# Patient Record
Sex: MALE | Race: WHITE | Employment: FULL TIME | URBAN - METROPOLITAN AREA
[De-identification: names, ages, dates, MRNs, and addresses within clinical notes are randomized per-mention and may not be internally consistent; named-entity substitution may affect disease eponyms.]

---

## 2019-08-01 ENCOUNTER — HOSPITAL ENCOUNTER (EMERGENCY)
Age: 24
Discharge: HOME | End: 2019-08-02
Attending: SPECIALIST
Payer: COMMERCIAL

## 2019-08-01 ENCOUNTER — APPOINTMENT (OUTPATIENT)
Dept: GENERAL RADIOLOGY | Age: 24
End: 2019-08-01
Payer: COMMERCIAL

## 2019-08-01 VITALS
OXYGEN SATURATION: 100 % | SYSTOLIC BLOOD PRESSURE: 114 MMHG | HEIGHT: 68 IN | DIASTOLIC BLOOD PRESSURE: 68 MMHG | WEIGHT: 150 LBS | HEART RATE: 63 BPM | RESPIRATION RATE: 14 BRPM | BODY MASS INDEX: 22.73 KG/M2 | TEMPERATURE: 98.2 F

## 2019-08-01 DIAGNOSIS — S60.10XA SUBUNGUAL HEMATOMA OF DIGIT OF HAND, INITIAL ENCOUNTER: ICD-10-CM

## 2019-08-01 DIAGNOSIS — S62.639A CLOSED FRACTURE OF TUFT OF DISTAL PHALANX OF FINGER: Primary | ICD-10-CM

## 2019-08-01 PROCEDURE — 99283 EMERGENCY DEPT VISIT LOW MDM: CPT

## 2019-08-01 PROCEDURE — 6370000000 HC RX 637 (ALT 250 FOR IP): Performed by: SPECIALIST

## 2019-08-01 PROCEDURE — 11740 EVACUATION SUBUNGUAL HMTMA: CPT

## 2019-08-01 PROCEDURE — 73140 X-RAY EXAM OF FINGER(S): CPT

## 2019-08-01 RX ORDER — IBUPROFEN 600 MG/1
600 TABLET ORAL ONCE
Status: COMPLETED | OUTPATIENT
Start: 2019-08-01 | End: 2019-08-01

## 2019-08-01 RX ORDER — IBUPROFEN 600 MG/1
600 TABLET ORAL EVERY 6 HOURS PRN
Qty: 20 TABLET | Refills: 0 | Status: SHIPPED | OUTPATIENT
Start: 2019-08-01 | End: 2019-08-05

## 2019-08-01 RX ORDER — ACETAMINOPHEN 500 MG
1000 TABLET ORAL ONCE
Status: COMPLETED | OUTPATIENT
Start: 2019-08-01 | End: 2019-08-01

## 2019-08-01 RX ADMIN — ACETAMINOPHEN 1000 MG: 500 TABLET ORAL at 23:38

## 2019-08-01 RX ADMIN — IBUPROFEN 600 MG: 600 TABLET ORAL at 23:38

## 2019-08-01 ASSESSMENT — PAIN SCALES - GENERAL
PAINLEVEL_OUTOF10: 6
PAINLEVEL_OUTOF10: 6

## 2019-08-02 ASSESSMENT — ENCOUNTER SYMPTOMS
COLOR CHANGE: 1
BACK PAIN: 0

## 2019-08-02 ASSESSMENT — PAIN SCALES - GENERAL: PAINLEVEL_OUTOF10: 3

## 2019-08-02 NOTE — ED PROVIDER NOTES
reports that he does not use drugs. PHYSICAL EXAM     INITIAL VITALS:  height is 5' 8\" (1.727 m) and weight is 68 kg (150 lb). His oral temperature is 98.2 °F (36.8 °C). His blood pressure is 114/68 and his pulse is 63. His respiration is 14 and oxygen saturation is 100%. Physical Exam   Constitutional: He is oriented to person, place, and time. He appears well-developed and well-nourished. HENT:   Head: Normocephalic and atraumatic. Nose: Nose normal.   Mouth/Throat: Oropharynx is clear and moist.   Eyes: Pupils are equal, round, and reactive to light. EOM are normal.   Neck: Normal range of motion. Neck supple. Cardiovascular: Normal rate, regular rhythm, normal heart sounds and intact distal pulses. No murmur heard. Pulmonary/Chest: Effort normal and breath sounds normal. No respiratory distress. Abdominal: Soft. Bowel sounds are normal. He exhibits no distension. There is no tenderness. Musculoskeletal:        Left hand: He exhibits tenderness and swelling. He exhibits no deformity and no laceration. Patient has subungual hematoma in the proximal half of the left middle finger and there is mild edema and tenderness on the volar aspect of the left middle finger in the terminal phalangeal area. Skin is intact. Neurovascular examination is intact distally. Neurological: He is alert and oriented to person, place, and time. Skin: Skin is warm and dry. Nursing note and vitals reviewed.         DIFFERENTIAL DIAGNOSIS/ MDM:     Tuft fracture, dislocation, contusion, subungual hematoma    DIAGNOSTIC RESULTS     EKG: All EKG's are interpreted by the Emergency Department Physician who either signs or Co-signs this chart in the absence of a cardiologist.    None obtained    RADIOLOGY:   Non-plain film images such as CT, Ultrasound and MRI are read by the radiologist. Plain radiographic images are visualized and the radiologist interpretations are reviewed as follows:     Xr Finger Left (min 2 Views)    Result Date: 8/1/2019  EXAMINATION: THREE XRAY VIEWS OF THE LEFT FINGERS 8/1/2019 10:32 pm COMPARISON: None. HISTORY: ORDERING SYSTEM PROVIDED HISTORY: Injured with a hammer TECHNOLOGIST PROVIDED HISTORY: Injured with a hammer Reason for Exam: left hand injury Acuity: Acute Type of Exam: Initial FINDINGS: Frontal, lateral and oblique views of the left hand with emphasis on the 3rd digit. Soft tissue swelling about the 2nd and 3rd digits. Acute nondisplaced fracture of the distal tuft of the 3rd distal phalanx. Joint spaces are preserved. Bone mineralization is normal.     Acute fracture of the distal tuft of the 3rd distal phalanx. LABS:  No results found for this visit on 08/01/19. EMERGENCY DEPARTMENT COURSE:   Vitals:    Vitals:    08/01/19 2128 08/01/19 2335   BP: 114/68    Pulse: 63    Resp: 14    Temp: 98.2 °F (36.8 °C)    TempSrc: Oral    SpO2: 100%    Weight:  68 kg (150 lb)   Height:  5' 8\" (1.727 m)     -------------------------  BP: 114/68, Temp: 98.2 °F (36.8 °C), Pulse: 63, Resp: 14    Orders Placed This Encounter   Medications    ibuprofen (ADVIL;MOTRIN) tablet 600 mg    acetaminophen (TYLENOL) tablet 1,000 mg    ibuprofen (IBU) 600 MG tablet     Sig: Take 1 tablet by mouth every 6 hours as needed for Pain     Dispense:  20 tablet     Refill:  0         During emergency department course, patient was given Tylenol 1000 mg and Motrin 600 mg orally. Under strict aseptic precautions, subungual hematoma was drained by myself with the help of heat cautery. Patient tolerated the procedure well. Dressing and finger splint was applied. Plan is to discharge the patient with instructions to apply ice packs locally, elevate the left hand, take Tylenol and ibuprofen as needed for the pain, follow up with orthopedic surgeon Dr. Danielle Garrett, all his office tomorrow morning for appointment, return if worse.     CONSULTS:  Orthopedic surgeon as an outpatient    PROCEDURES:  None    FINAL

## 2019-08-02 NOTE — ED NOTES
Patient to ED after accidentally striking the tip of his left middle finger with a hammer. Site is swollen, blackened under nailbed. Injury happened about 20 mins ago. Has not attempted any interventions for pain.        Craig Dean RN  08/01/19 4454

## 2019-08-05 ENCOUNTER — OFFICE VISIT (OUTPATIENT)
Dept: ORTHOPEDIC SURGERY | Age: 24
End: 2019-08-05
Payer: COMMERCIAL

## 2019-08-05 VITALS
HEIGHT: 69 IN | BODY MASS INDEX: 19.99 KG/M2 | SYSTOLIC BLOOD PRESSURE: 138 MMHG | DIASTOLIC BLOOD PRESSURE: 78 MMHG | WEIGHT: 135 LBS

## 2019-08-05 DIAGNOSIS — S62.639A CLOSED FRACTURE OF TUFT OF DISTAL PHALANX OF FINGER: Primary | ICD-10-CM

## 2019-08-05 PROCEDURE — 99203 OFFICE O/P NEW LOW 30 MIN: CPT | Performed by: ORTHOPAEDIC SURGERY

## 2020-04-07 ENCOUNTER — HOSPITAL ENCOUNTER (EMERGENCY)
Age: 25
Discharge: HOME | End: 2020-04-08
Attending: EMERGENCY MEDICINE
Payer: COMMERCIAL

## 2020-04-07 PROCEDURE — 99284 EMERGENCY DEPT VISIT MOD MDM: CPT

## 2020-04-07 ASSESSMENT — PAIN DESCRIPTION - PAIN TYPE: TYPE: ACUTE PAIN

## 2020-04-07 ASSESSMENT — PAIN DESCRIPTION - LOCATION: LOCATION: GROIN

## 2020-04-07 ASSESSMENT — PAIN DESCRIPTION - ORIENTATION: ORIENTATION: RIGHT

## 2020-04-07 ASSESSMENT — PAIN SCALES - GENERAL: PAINLEVEL_OUTOF10: 3

## 2020-04-08 ENCOUNTER — APPOINTMENT (OUTPATIENT)
Dept: ULTRASOUND IMAGING | Age: 25
End: 2020-04-08
Payer: COMMERCIAL

## 2020-04-08 VITALS
DIASTOLIC BLOOD PRESSURE: 74 MMHG | TEMPERATURE: 99.5 F | SYSTOLIC BLOOD PRESSURE: 138 MMHG | OXYGEN SATURATION: 98 % | RESPIRATION RATE: 16 BRPM | HEIGHT: 68 IN | WEIGHT: 150 LBS | HEART RATE: 84 BPM | BODY MASS INDEX: 22.73 KG/M2

## 2020-04-08 LAB
-: NORMAL
AMORPHOUS: NORMAL
BACTERIA: NORMAL
BILIRUBIN URINE: NEGATIVE
CASTS UA: NORMAL /LPF
COLOR: YELLOW
COMMENT UA: NORMAL
CRYSTALS, UA: NORMAL /HPF
EPITHELIAL CELLS UA: NORMAL /HPF (ref 0–5)
GLUCOSE URINE: NEGATIVE
KETONES, URINE: NEGATIVE
LEUKOCYTE ESTERASE, URINE: NEGATIVE
MUCUS: NORMAL
NITRITE, URINE: NEGATIVE
OTHER OBSERVATIONS UA: NORMAL
PH UA: 6.5 (ref 5–8)
PROTEIN UA: NEGATIVE
RBC UA: NORMAL /HPF (ref 0–2)
RENAL EPITHELIAL, UA: NORMAL /HPF
SPECIFIC GRAVITY UA: 1.01 (ref 1–1.03)
TRICHOMONAS: NORMAL
TURBIDITY: CLEAR
URINE HGB: NEGATIVE
UROBILINOGEN, URINE: NORMAL
WBC UA: NORMAL /HPF (ref 0–5)
YEAST: NORMAL

## 2020-04-08 PROCEDURE — 93976 VASCULAR STUDY: CPT

## 2020-04-08 PROCEDURE — 87491 CHLMYD TRACH DNA AMP PROBE: CPT

## 2020-04-08 PROCEDURE — 81015 MICROSCOPIC EXAM OF URINE: CPT

## 2020-04-08 PROCEDURE — 87591 N.GONORRHOEAE DNA AMP PROB: CPT

## 2020-04-08 PROCEDURE — 76870 US EXAM SCROTUM: CPT

## 2020-04-08 NOTE — ED PROVIDER NOTES
bilaterally. No hernia palpable. Normal cremasteric reflex. MUSCULOSKELETAL: No midline spinal tenderness, step off or deformity. Extremities are otherwise nontender to palpation and nonerythematous. Compartments soft. No peripheral edema. NEUROLOGIC: alert and oriented x 3, GCS 15, normal mentation and speech. Moves all extremities x 4 without motor or sensory deficit, gait is stable without ataxia  PSYCHIATRIC: normal mood and affect, thought process is clear and linear    DIAGNOSTIC RESULTS     EKG:  None    RADIOLOGY:   Us Scrotum And Testicles    Result Date: 4/8/2020  EXAMINATION: ULTRASOUND OF THE SCROTUM/TESTICLES WITH COLOR DOPPLER FLOW EVALUATION; DOPPLER EVALUATION 4/7/2020 COMPARISON: None. HISTORY: ORDERING SYSTEM PROVIDED HISTORY: right testicular pain TECHNOLOGIST PROVIDED HISTORY: right testicular pain Reason for Exam: right testicular pain; r/o torsion Acuity: Acute Type of Exam: Initial FINDINGS: Measurements: Right testicle: 4.6 x 2.7 x 2.6 cm Left testicle: 4.2 x 3.2 x 2.6 cm Right: Grey scale: The right testicle demonstrates normal homogeneous echotexture without focal lesion. No evidence of testicular microlithiasis. Doppler Evaluation:  There is normal arterial and venous Doppler flow within the testicle. Scrotal Sac:  Small hydrocele. Deep to the right testicle there is a heterogeneously hypoechoic tubular structure which measures up to 4 mm in diameter and demonstrates no internal blood flow. This could represent edema within the scrotal wall or a thrombosed vessel. Epididymis:  No acute abnormality. Left: Grey scale: The left testicle demonstrates normal homogeneous echotexture without focal lesion. No evidence of testicular microlithiasis. Doppler Evaluation:  There is normal arterial and venous Doppler flow within the testicle. Scrotal Sac:  Small hydrocele. No evidence of varicocele. Epididymis:  No acute abnormality.      1.  Normal testicles without evidence of testicular torsion. 2.  Small bilateral hydroceles. 3.  Deep to the right testicle there is a heterogeneously hypoechoic tubular structure which measures up to 4 mm in diameter and demonstrates no internal blood flow. Findings are nonspecific but could represent edema within the scrotal wall or a thrombosed vessel. Us Dup Abd Pel Retro Scrot Limited    Result Date: 4/8/2020  EXAMINATION: ULTRASOUND OF THE SCROTUM/TESTICLES WITH COLOR DOPPLER FLOW EVALUATION; DOPPLER EVALUATION 4/7/2020 COMPARISON: None. HISTORY: ORDERING SYSTEM PROVIDED HISTORY: right testicular pain TECHNOLOGIST PROVIDED HISTORY: right testicular pain Reason for Exam: right testicular pain; r/o torsion Acuity: Acute Type of Exam: Initial FINDINGS: Measurements: Right testicle: 4.6 x 2.7 x 2.6 cm Left testicle: 4.2 x 3.2 x 2.6 cm Right: Grey scale: The right testicle demonstrates normal homogeneous echotexture without focal lesion. No evidence of testicular microlithiasis. Doppler Evaluation:  There is normal arterial and venous Doppler flow within the testicle. Scrotal Sac:  Small hydrocele. Deep to the right testicle there is a heterogeneously hypoechoic tubular structure which measures up to 4 mm in diameter and demonstrates no internal blood flow. This could represent edema within the scrotal wall or a thrombosed vessel. Epididymis:  No acute abnormality. Left: Grey scale: The left testicle demonstrates normal homogeneous echotexture without focal lesion. No evidence of testicular microlithiasis. Doppler Evaluation:  There is normal arterial and venous Doppler flow within the testicle. Scrotal Sac:  Small hydrocele. No evidence of varicocele. Epididymis:  No acute abnormality. 1.  Normal testicles without evidence of testicular torsion. 2.  Small bilateral hydroceles. 3.  Deep to the right testicle there is a heterogeneously hypoechoic tubular structure which measures up to 4 mm in diameter and demonstrates no internal blood flow.   Findings are nonspecific but could represent edema within the scrotal wall or a thrombosed vessel. LABS:  Results for orders placed or performed during the hospital encounter of 04/07/20   Urinalysis Reflex to Culture   Result Value Ref Range    Color, UA YELLOW YELLOW    Turbidity UA CLEAR CLEAR    Glucose, Ur NEGATIVE NEGATIVE    Bilirubin Urine NEGATIVE NEGATIVE    Ketones, Urine NEGATIVE NEGATIVE    Specific Gravity, UA 1.010 1.005 - 1.030    Urine Hgb NEGATIVE NEGATIVE    pH, UA 6.5 5.0 - 8.0    Protein, UA NEGATIVE NEGATIVE    Urobilinogen, Urine Normal Normal    Nitrite, Urine NEGATIVE NEGATIVE    Leukocyte Esterase, Urine NEGATIVE NEGATIVE    Urinalysis Comments       Microscopic exam not performed based on chemical results unless requested in original order. Urinalysis Comments          Urinalysis Comments       Utilizing a urinalysis as the only screening method to exclude a potential uropathogen can be unreliable in many patient populations. Rapid screening tests are less sensitive than culture and if UTI is a clinical possibility, culture should be considered despite a negative urinalysis. URINALYSIS, MICRO   Result Value Ref Range    -          WBC, UA None 0 - 5 /HPF    RBC, UA 0 TO 2 0 - 2 /HPF    Casts UA NOT REPORTED /LPF    Crystals, UA NOT REPORTED None /HPF    Epithelial Cells UA 0 TO 2 0 - 5 /HPF    Renal Epithelial, UA NOT REPORTED 0 /HPF    Bacteria, UA NOT REPORTED None    Mucus, UA NOT REPORTED None    Trichomonas, UA None None    Amorphous, UA NOT REPORTED None    Other Observations UA NOT REPORTED NOT REQ. Yeast, UA NOT REPORTED None       EMERGENCY DEPARTMENT COURSE:        The patient was given the following medications:  No orders of the defined types were placed in this encounter.        Vitals:    Vitals:    04/07/20 2338 04/08/20 0147   BP: (!) 142/89 138/74   Pulse: 97 84   Resp: 18 16   Temp: 99.5 °F (37.5 °C)    TempSrc: Oral    SpO2: 99% 98%   Weight: 68 kg (150 lb) Height: 5' 8\" (1.727 m)      -------------------------  BP: 138/74, Temp: 99.5 °F (37.5 °C), Pulse: 84, Resp: 16    CONSULTS:  None    CRITICAL CARE:   None    PROCEDURES:  None    DIAGNOSIS/ MDM:   Katie Warner is a 26 y.o. male who presents with right testicular pain. Vital signs are stable. Heart regular rate and rhythm. Abdomen soft and nontender with no signs of hernia. He states that the right testicle is painful but not tender to palpation. The right testicle lies superior and anterior compared to the left testicle. Normal cremasteric reflex. Urinalysis is unremarkable. Ultrasound of the scrotum and testicles reveals normal testicles without any evidence of torsion. There are small bilateral hydroceles. Due to the right testicle there is a heterogeneous hypoechoic tubular structure which measures up to 4 mm in diameter and demonstrates no internal blood flow. Radiology states that these findings are nonspecific but could represent edema within the scrotal wall or a thrombosed vessel. I have low suspicion for torsion, infection, or testicular tumor at this time, however we discussed that the patient will need to follow-up with urology for further evaluation. The patient states that his pain is very mild and he declines any pain medications. I gave him information to follow-up with urology and instructed him to take ibuprofen or Tylenol as needed for pain. He was instructed to return to the ED for worsening symptoms or any other concern. At this time the patient is without objective evidence of an acute process requiring hospitalization or inpatient management. They have remained hemodynamically stable and are stable for discharge with outpatient follow-up. The patient is agreeable with plan and is requesting discharge. They were given the opportunity to ask questions and all questions were answered. The patient was given both written and verbal discharge instructions.  The patient expressed understanding. FINAL IMPRESSION      1. Pain in right testicle    2. Hydrocele, unspecified hydrocele type          DISPOSITION/PLAN   DISPOSITION          PATIENT REFERRED TO:  Francheska Soto MD  [address] [facility]  [address] [facility] [phone]    Schedule an appointment as soon as possible for a visit in 2 days  Urologist    Rito Thompson MD  [address] [phone]      Urologist    [facility]  [address] [facility].   [place] [address]  [phone]  Go to   If symptoms worsen      DISCHARGE MEDICATIONS:  New Prescriptions    No medications on file       (Please note that portions of this note were completed with a voice recognitionprogram.  Efforts were made to edit the dictations but occasionally words are mis-transcribed.)    [address]  Emergency Physician Attending          Jackie Michele DO  04/08/20 4342

## 2020-04-09 LAB
C. TRACHOMATIS DNA ,URINE: NEGATIVE
N. GONORRHOEAE DNA, URINE: NEGATIVE
SPECIMEN DESCRIPTION: NORMAL

## 2022-08-10 ENCOUNTER — HOSPITAL ENCOUNTER (OUTPATIENT)
Age: 27
Setting detail: OBSERVATION
Discharge: HOME | End: 2022-08-11
Attending: EMERGENCY MEDICINE | Admitting: STUDENT IN AN ORGANIZED HEALTH CARE EDUCATION/TRAINING PROGRAM
Payer: COMMERCIAL

## 2022-08-10 ENCOUNTER — APPOINTMENT (OUTPATIENT)
Dept: CT IMAGING | Age: 27
End: 2022-08-10
Payer: COMMERCIAL

## 2022-08-10 DIAGNOSIS — I26.99 BILATERAL PULMONARY EMBOLISM (HCC): Primary | ICD-10-CM

## 2022-08-10 DIAGNOSIS — R07.1 CHEST PAIN ON BREATHING: ICD-10-CM

## 2022-08-10 PROBLEM — Z72.0 TOBACCO ABUSE: Status: ACTIVE | Noted: 2022-08-10

## 2022-08-10 LAB
ABSOLUTE EOS #: 0 K/UL (ref 0–0.4)
ABSOLUTE LYMPH #: 0.7 K/UL (ref 1–4.8)
ABSOLUTE MONO #: 0.6 K/UL (ref 0.1–1.2)
ALBUMIN SERPL-MCNC: 4.4 G/DL (ref 3.5–5.2)
ALBUMIN/GLOBULIN RATIO: 1.5 (ref 1–2.5)
ALP BLD-CCNC: 64 U/L (ref 40–129)
ALT SERPL-CCNC: 13 U/L (ref 5–41)
ANION GAP SERPL CALCULATED.3IONS-SCNC: 9 MMOL/L (ref 9–17)
AST SERPL-CCNC: 15 U/L
BASOPHILS # BLD: 0 % (ref 0–2)
BASOPHILS ABSOLUTE: 0 K/UL (ref 0–0.2)
BILIRUB SERPL-MCNC: 0.36 MG/DL (ref 0.3–1.2)
BUN BLDV-MCNC: 8 MG/DL (ref 6–20)
CALCIUM SERPL-MCNC: 9.5 MG/DL (ref 8.6–10.4)
CHLORIDE BLD-SCNC: 103 MMOL/L (ref 98–107)
CO2: 26 MMOL/L (ref 20–31)
CREAT SERPL-MCNC: 0.83 MG/DL (ref 0.7–1.2)
D-DIMER QUANTITATIVE: 3.28 MG/L FEU
EOSINOPHILS RELATIVE PERCENT: 0 % (ref 1–4)
GFR AFRICAN AMERICAN: >60 ML/MIN
GFR NON-AFRICAN AMERICAN: >60 ML/MIN
GFR SERPL CREATININE-BSD FRML MDRD: ABNORMAL ML/MIN/{1.73_M2}
GLUCOSE BLD-MCNC: 141 MG/DL (ref 70–99)
HCT VFR BLD CALC: 36.7 % (ref 41–53)
HCT VFR BLD CALC: 38.9 % (ref 41–53)
HEMOGLOBIN: 12.8 G/DL (ref 13.5–17.5)
HEMOGLOBIN: 13.6 G/DL (ref 13.5–17.5)
HOMOCYSTEINE: 8 UMOL/L
LIPASE: 15 U/L (ref 13–60)
LYMPHOCYTES # BLD: 8 % (ref 24–44)
MCH RBC QN AUTO: 32.1 PG (ref 26–34)
MCH RBC QN AUTO: 32.3 PG (ref 26–34)
MCHC RBC AUTO-ENTMCNC: 34.8 G/DL (ref 31–37)
MCHC RBC AUTO-ENTMCNC: 34.9 G/DL (ref 31–37)
MCV RBC AUTO: 92.4 FL (ref 80–100)
MCV RBC AUTO: 92.5 FL (ref 80–100)
MONOCYTES # BLD: 7 % (ref 2–11)
PARTIAL THROMBOPLASTIN TIME: 61.6 SEC (ref 21.3–31.3)
PDW BLD-RTO: 12.9 % (ref 12.5–15.4)
PDW BLD-RTO: 12.9 % (ref 12.5–15.4)
PLATELET # BLD: 202 K/UL (ref 140–450)
PLATELET # BLD: 222 K/UL (ref 140–450)
PMV BLD AUTO: 7.6 FL (ref 6–12)
PMV BLD AUTO: 7.7 FL (ref 6–12)
POTASSIUM SERPL-SCNC: 4.1 MMOL/L (ref 3.7–5.3)
RBC # BLD: 3.97 M/UL (ref 4.5–5.9)
RBC # BLD: 4.21 M/UL (ref 4.5–5.9)
SARS-COV-2, RAPID: NOT DETECTED
SEG NEUTROPHILS: 85 % (ref 36–66)
SEGMENTED NEUTROPHILS ABSOLUTE COUNT: 7.5 K/UL (ref 1.8–7.7)
SODIUM BLD-SCNC: 138 MMOL/L (ref 135–144)
SPECIMEN DESCRIPTION: NORMAL
TOTAL PROTEIN: 7.4 G/DL (ref 6.4–8.3)
TROPONIN, HIGH SENSITIVITY: <6 NG/L (ref 0–22)
WBC # BLD: 6.8 K/UL (ref 3.5–11)
WBC # BLD: 8.8 K/UL (ref 3.5–11)

## 2022-08-10 PROCEDURE — 86147 CARDIOLIPIN ANTIBODY EA IG: CPT

## 2022-08-10 PROCEDURE — 83690 ASSAY OF LIPASE: CPT

## 2022-08-10 PROCEDURE — 81240 F2 GENE: CPT

## 2022-08-10 PROCEDURE — 2580000003 HC RX 258: Performed by: EMERGENCY MEDICINE

## 2022-08-10 PROCEDURE — 96375 TX/PRO/DX INJ NEW DRUG ADDON: CPT

## 2022-08-10 PROCEDURE — 85379 FIBRIN DEGRADATION QUANT: CPT

## 2022-08-10 PROCEDURE — 96365 THER/PROPH/DIAG IV INF INIT: CPT

## 2022-08-10 PROCEDURE — 6360000002 HC RX W HCPCS: Performed by: NURSE PRACTITIONER

## 2022-08-10 PROCEDURE — 94760 N-INVAS EAR/PLS OXIMETRY 1: CPT

## 2022-08-10 PROCEDURE — 36415 COLL VENOUS BLD VENIPUNCTURE: CPT

## 2022-08-10 PROCEDURE — 81291 MTHFR GENE: CPT

## 2022-08-10 PROCEDURE — 80053 COMPREHEN METABOLIC PANEL: CPT

## 2022-08-10 PROCEDURE — G0378 HOSPITAL OBSERVATION PER HR: HCPCS

## 2022-08-10 PROCEDURE — 81241 F5 GENE: CPT

## 2022-08-10 PROCEDURE — 83036 HEMOGLOBIN GLYCOSYLATED A1C: CPT

## 2022-08-10 PROCEDURE — 85027 COMPLETE CBC AUTOMATED: CPT

## 2022-08-10 PROCEDURE — 85300 ANTITHROMBIN III ACTIVITY: CPT

## 2022-08-10 PROCEDURE — 71260 CT THORAX DX C+: CPT | Performed by: PHYSICIAN ASSISTANT

## 2022-08-10 PROCEDURE — 85303 CLOT INHIBIT PROT C ACTIVITY: CPT

## 2022-08-10 PROCEDURE — 85610 PROTHROMBIN TIME: CPT

## 2022-08-10 PROCEDURE — 85613 RUSSELL VIPER VENOM DILUTED: CPT

## 2022-08-10 PROCEDURE — 6360000004 HC RX CONTRAST MEDICATION: Performed by: EMERGENCY MEDICINE

## 2022-08-10 PROCEDURE — 6360000002 HC RX W HCPCS: Performed by: PHYSICIAN ASSISTANT

## 2022-08-10 PROCEDURE — 86038 ANTINUCLEAR ANTIBODIES: CPT

## 2022-08-10 PROCEDURE — 96366 THER/PROPH/DIAG IV INF ADDON: CPT

## 2022-08-10 PROCEDURE — 85306 CLOT INHIBIT PROT S FREE: CPT

## 2022-08-10 PROCEDURE — 2580000003 HC RX 258: Performed by: PHYSICIAN ASSISTANT

## 2022-08-10 PROCEDURE — 86225 DNA ANTIBODY NATIVE: CPT

## 2022-08-10 PROCEDURE — 99285 EMERGENCY DEPT VISIT HI MDM: CPT

## 2022-08-10 PROCEDURE — 85025 COMPLETE CBC W/AUTO DIFF WBC: CPT

## 2022-08-10 PROCEDURE — 83090 ASSAY OF HOMOCYSTEINE: CPT

## 2022-08-10 PROCEDURE — 87635 SARS-COV-2 COVID-19 AMP PRB: CPT

## 2022-08-10 PROCEDURE — 84484 ASSAY OF TROPONIN QUANT: CPT

## 2022-08-10 PROCEDURE — 99219 PR INITIAL OBSERVATION CARE/DAY 50 MINUTES: CPT | Performed by: STUDENT IN AN ORGANIZED HEALTH CARE EDUCATION/TRAINING PROGRAM

## 2022-08-10 PROCEDURE — 85730 THROMBOPLASTIN TIME PARTIAL: CPT

## 2022-08-10 PROCEDURE — 6370000000 HC RX 637 (ALT 250 FOR IP): Performed by: STUDENT IN AN ORGANIZED HEALTH CARE EDUCATION/TRAINING PROGRAM

## 2022-08-10 PROCEDURE — 93005 ELECTROCARDIOGRAM TRACING: CPT | Performed by: PHYSICIAN ASSISTANT

## 2022-08-10 RX ORDER — ONDANSETRON 4 MG/1
4 TABLET, ORALLY DISINTEGRATING ORAL EVERY 8 HOURS PRN
Status: DISCONTINUED | OUTPATIENT
Start: 2022-08-10 | End: 2022-08-11 | Stop reason: HOSPADM

## 2022-08-10 RX ORDER — 0.9 % SODIUM CHLORIDE 0.9 %
500 INTRAVENOUS SOLUTION INTRAVENOUS ONCE
Status: COMPLETED | OUTPATIENT
Start: 2022-08-10 | End: 2022-08-10

## 2022-08-10 RX ORDER — HEPARIN SODIUM 10000 [USP'U]/100ML
5-30 INJECTION, SOLUTION INTRAVENOUS CONTINUOUS
Status: DISCONTINUED | OUTPATIENT
Start: 2022-08-10 | End: 2022-08-10 | Stop reason: DRUGHIGH

## 2022-08-10 RX ORDER — SODIUM CHLORIDE 0.9 % (FLUSH) 0.9 %
5-40 SYRINGE (ML) INJECTION EVERY 12 HOURS SCHEDULED
Status: DISCONTINUED | OUTPATIENT
Start: 2022-08-10 | End: 2022-08-11 | Stop reason: HOSPADM

## 2022-08-10 RX ORDER — HEPARIN SODIUM 1000 [USP'U]/ML
80 INJECTION, SOLUTION INTRAVENOUS; SUBCUTANEOUS PRN
Status: DISCONTINUED | OUTPATIENT
Start: 2022-08-10 | End: 2022-08-11

## 2022-08-10 RX ORDER — ACETAMINOPHEN 650 MG/1
650 SUPPOSITORY RECTAL EVERY 6 HOURS PRN
Status: DISCONTINUED | OUTPATIENT
Start: 2022-08-10 | End: 2022-08-11 | Stop reason: HOSPADM

## 2022-08-10 RX ORDER — SODIUM CHLORIDE 0.9 % (FLUSH) 0.9 %
5-40 SYRINGE (ML) INJECTION PRN
Status: DISCONTINUED | OUTPATIENT
Start: 2022-08-10 | End: 2022-08-11 | Stop reason: HOSPADM

## 2022-08-10 RX ORDER — ONDANSETRON 2 MG/ML
4 INJECTION INTRAMUSCULAR; INTRAVENOUS EVERY 6 HOURS PRN
Status: DISCONTINUED | OUTPATIENT
Start: 2022-08-10 | End: 2022-08-11 | Stop reason: HOSPADM

## 2022-08-10 RX ORDER — SODIUM CHLORIDE 0.9 % (FLUSH) 0.9 %
10 SYRINGE (ML) INJECTION PRN
Status: DISCONTINUED | OUTPATIENT
Start: 2022-08-10 | End: 2022-08-11 | Stop reason: HOSPADM

## 2022-08-10 RX ORDER — 0.9 % SODIUM CHLORIDE 0.9 %
80 INTRAVENOUS SOLUTION INTRAVENOUS ONCE
Status: DISCONTINUED | OUTPATIENT
Start: 2022-08-10 | End: 2022-08-10

## 2022-08-10 RX ORDER — SODIUM CHLORIDE 9 MG/ML
INJECTION, SOLUTION INTRAVENOUS PRN
Status: DISCONTINUED | OUTPATIENT
Start: 2022-08-10 | End: 2022-08-11 | Stop reason: HOSPADM

## 2022-08-10 RX ORDER — HEPARIN SODIUM 1000 [USP'U]/ML
80 INJECTION, SOLUTION INTRAVENOUS; SUBCUTANEOUS PRN
Status: DISCONTINUED | OUTPATIENT
Start: 2022-08-10 | End: 2022-08-10

## 2022-08-10 RX ORDER — HEPARIN SODIUM 1000 [USP'U]/ML
80 INJECTION, SOLUTION INTRAVENOUS; SUBCUTANEOUS ONCE
Status: COMPLETED | OUTPATIENT
Start: 2022-08-10 | End: 2022-08-10

## 2022-08-10 RX ORDER — HEPARIN SODIUM 10000 [USP'U]/100ML
5-30 INJECTION, SOLUTION INTRAVENOUS CONTINUOUS
Status: DISCONTINUED | OUTPATIENT
Start: 2022-08-10 | End: 2022-08-11

## 2022-08-10 RX ORDER — HEPARIN SODIUM 1000 [USP'U]/ML
40 INJECTION, SOLUTION INTRAVENOUS; SUBCUTANEOUS PRN
Status: DISCONTINUED | OUTPATIENT
Start: 2022-08-10 | End: 2022-08-11

## 2022-08-10 RX ORDER — HEPARIN SODIUM 1000 [USP'U]/ML
40 INJECTION, SOLUTION INTRAVENOUS; SUBCUTANEOUS PRN
Status: DISCONTINUED | OUTPATIENT
Start: 2022-08-10 | End: 2022-08-10

## 2022-08-10 RX ORDER — HEPARIN SODIUM 1000 [USP'U]/ML
80 INJECTION, SOLUTION INTRAVENOUS; SUBCUTANEOUS ONCE
Status: DISCONTINUED | OUTPATIENT
Start: 2022-08-10 | End: 2022-08-10 | Stop reason: DRUGHIGH

## 2022-08-10 RX ORDER — KETOROLAC TROMETHAMINE 15 MG/ML
15 INJECTION, SOLUTION INTRAMUSCULAR; INTRAVENOUS ONCE
Status: COMPLETED | OUTPATIENT
Start: 2022-08-10 | End: 2022-08-10

## 2022-08-10 RX ORDER — MORPHINE SULFATE 2 MG/ML
2 INJECTION, SOLUTION INTRAMUSCULAR; INTRAVENOUS
Status: DISCONTINUED | OUTPATIENT
Start: 2022-08-10 | End: 2022-08-11

## 2022-08-10 RX ORDER — HEPARIN SODIUM 10000 [USP'U]/100ML
5-30 INJECTION, SOLUTION INTRAVENOUS CONTINUOUS
Status: DISCONTINUED | OUTPATIENT
Start: 2022-08-10 | End: 2022-08-10

## 2022-08-10 RX ORDER — ACETAMINOPHEN 325 MG/1
650 TABLET ORAL EVERY 6 HOURS PRN
Status: DISCONTINUED | OUTPATIENT
Start: 2022-08-10 | End: 2022-08-11 | Stop reason: HOSPADM

## 2022-08-10 RX ORDER — MORPHINE SULFATE 4 MG/ML
4 INJECTION, SOLUTION INTRAMUSCULAR; INTRAVENOUS
Status: DISCONTINUED | OUTPATIENT
Start: 2022-08-10 | End: 2022-08-11

## 2022-08-10 RX ORDER — POLYETHYLENE GLYCOL 3350 17 G/17G
17 POWDER, FOR SOLUTION ORAL DAILY PRN
Status: DISCONTINUED | OUTPATIENT
Start: 2022-08-10 | End: 2022-08-11 | Stop reason: HOSPADM

## 2022-08-10 RX ADMIN — SODIUM CHLORIDE 500 ML: 9 INJECTION, SOLUTION INTRAVENOUS at 12:50

## 2022-08-10 RX ADMIN — Medication 80 ML: at 13:08

## 2022-08-10 RX ADMIN — SODIUM CHLORIDE, PRESERVATIVE FREE 10 ML: 5 INJECTION INTRAVENOUS at 13:09

## 2022-08-10 RX ADMIN — MORPHINE SULFATE 2 MG: 2 INJECTION, SOLUTION INTRAMUSCULAR; INTRAVENOUS at 21:46

## 2022-08-10 RX ADMIN — HEPARIN SODIUM 4970 UNITS: 1000 INJECTION INTRAVENOUS; SUBCUTANEOUS at 14:29

## 2022-08-10 RX ADMIN — ACETAMINOPHEN 650 MG: 325 TABLET ORAL at 20:05

## 2022-08-10 RX ADMIN — IOPAMIDOL 75 ML: 755 INJECTION, SOLUTION INTRAVENOUS at 13:09

## 2022-08-10 RX ADMIN — KETOROLAC TROMETHAMINE 15 MG: 15 INJECTION, SOLUTION INTRAMUSCULAR; INTRAVENOUS at 12:49

## 2022-08-10 RX ADMIN — HEPARIN SODIUM AND DEXTROSE 18 UNITS/KG/HR: 10000; 5 INJECTION INTRAVENOUS at 14:33

## 2022-08-10 ASSESSMENT — PAIN DESCRIPTION - ORIENTATION
ORIENTATION: LEFT

## 2022-08-10 ASSESSMENT — PAIN DESCRIPTION - DESCRIPTORS
DESCRIPTORS: STABBING
DESCRIPTORS: ACHING
DESCRIPTORS: STABBING

## 2022-08-10 ASSESSMENT — LIFESTYLE VARIABLES
HOW OFTEN DO YOU HAVE A DRINK CONTAINING ALCOHOL: 2-4 TIMES A MONTH
HOW MANY STANDARD DRINKS CONTAINING ALCOHOL DO YOU HAVE ON A TYPICAL DAY: 1 OR 2

## 2022-08-10 ASSESSMENT — PAIN SCALES - GENERAL
PAINLEVEL_OUTOF10: 8
PAINLEVEL_OUTOF10: 6
PAINLEVEL_OUTOF10: 4
PAINLEVEL_OUTOF10: 3

## 2022-08-10 ASSESSMENT — PAIN DESCRIPTION - LOCATION
LOCATION: CHEST
LOCATION: RIB CAGE

## 2022-08-10 ASSESSMENT — PAIN - FUNCTIONAL ASSESSMENT: PAIN_FUNCTIONAL_ASSESSMENT: 0-10

## 2022-08-10 NOTE — ED NOTES
Report given.      .Electronically signed by Joanne Matt RN on 8/10/2022 at 4:39 PM      Joanne Matt RN  08/10/22 [id number]

## 2022-08-10 NOTE — ED NOTES
Attempted to call report to inpatient, was notified that the RN taking patient will return call because she is currently taking an admission     . Electronically signed by Vijaya Colindres RN on 8/10/2022 at 3:54 PM      Vijaya Colindres [facility]  08/10/22 [id number]

## 2022-08-10 NOTE — ED PROVIDER NOTES
I was present in the ED during this patient's evaluation and management by the Advance Practice Provider and was available to address any concerns about their medical management. Summation      Patient Course: Admitted              Final Impression:   1. Bilateral pulmonary embolism (HCC)    2. Chest pain on breathing               (Please note that portions of this note were completed with a voice recognition program.  Efforts were made to edit the dictations but occasionally words are mis-transcribed.)     CLAUDIA Zavala MD  Attending, Emergency Department       Aracelis Zamudio MD  08/10/22 [address] MD Joey  08/16/22 1728

## 2022-08-10 NOTE — ED PROVIDER NOTES
[facility]. ICU  [address] [address]  Phone: 130 [facility] Name: Paulina Scruggs  MRN: [id number]  [place] 1995  Date of evaluation: 8/10/22      CHIEF COMPLAINT:  Chief Complaint   Patient presents with    Chest Pain     Left sided, x 4days, constant, increasing pain to left shoulder and back when deep breathing       HISTORY OF PRESENT ILLNESS    Paulina Scruggs is a 33 y.o. male who presents with RIB/PLEURITIC PAIN complaint:       Context/Setting:   Patient here for evaluation of persistent left-sided pleuritic pain over the last 4 days that is afebrile and nontraumatic in nature. Patient states this is very painful even with normal breathing but he also describes with movement this is provoking. He has had no recent upper respiratory/viral type symptoms. He denies any other pertinent cardiac/respiratory/abdominal/back past medical history. Patient reports that this pain is deep/pleuritic in nature and does radiate up to his left shoulder. No rash reported or any recent precipitating overexertion or trauma. Patient does report that he smokes daily. No thrombotic history. He denies any swelling or pain of the lower extremities    Location/Symptom:  Pleuritic pain? YES   Trauma? NO    Cough? YES    Productive? NO  Fever? NO  SOB? YES, when painful  Wheezing? NO  Chestpain associated? Yes  Smoker? YES     Timing/Onset:   4 days  Quality:   sharp  Duration:   constant  Modifying Factors:   breathing, movement  Severity:   moderate    Nursing Notes were reviewed. REVIEW OF SYSTEMS       Constitutional:  Per HPI  Eyes: No visual changes. Neck: No neck pain. Respiratory:  Per HPI  Cardiac:  Per HPI   GI:  Denies abdominal pain. Denies nausea. Denies vomiting. Denies diarrhea. : Denies dysuria. Musculoskeletal: Denies back pain. Denies focal weakness.     Neurologic: Denies headache or focal weakness. Skin:  Denies any rash. Negative in 10 essential Systems except as mentioned above and in the HPI. PAST MEDICAL HISTORY   PMH:  has no past medical history on file. Surgical History:  has no past surgical history on file. Social History:  reports that he has been smoking cigarettes. He uses smokeless tobacco. He reports that he does not currently use alcohol. He reports that he does not use drugs. Family History: Noncontributory at this time  Psychiatric History: Noncontributory at this time    Allergies:has No Known Allergies. PHYSICAL EXAM     INITIAL VITALS: /73   Pulse 61   Temp 98.4 °F (36.9 °C) (Oral)   Resp 16   Ht 5' 8\" (1.727 m)   Wt 62 kg (136 lb 11 oz)   SpO2 98%   BMI 20.78 kg/m²   Constitutional:  Well developed, nontoxic. Mild pain distress. Eyes:  Pupils equal/round  HENT:  Atraumatic, external ears normal, nose normal, post pharynx and bilat tonsils wnl. No exudates. oropharynx moist. Neck- supple, no lyphadenopathy. No midline pain. Respiratory:  Clear to auscultation bilaterally with guarded air exchange due to pain elicited. No anterior chestwall TTP or signs of injury/edema/rash. No W/R/R. Cardiovascular:  RRR with normal S1 and S2  Gastrointestinal/Abdomen:  Soft, NT.  BS present. Musculoskeletal:  No edema, no tenderness, no deformities of extremities. Back:  No CVA tenderness. No midline TTP. Normal to inspection. Integument:  No rash. Neurologic:  Alert & age appropriate mentation, no focal deficits noted       DIAGNOSTIC RESULTS     EKG: All EKG's are interpreted by the Emergency Department Physician who either signs or Co-signs this chart in the absence of a cardiologist.  Not indicated, or per attending note    RADIOLOGY:   Reviewed the radiologist:  US DUP LOWER EXTREMITY RIGHT FLAVIA   Final Result   1. No evidence of DVT in the right lower extremity. 2.  No evidence of DVT in the left lower extremity.          [address] [facility] DUP LOWER EXTREMITY LEFT FLAVIA   Final Result   1. No evidence of DVT in the right lower extremity. 2.  No evidence of DVT in the left lower extremity. CT CHEST PULMONARY EMBOLISM W CONTRAST   Final Result   Addendum (preliminary) 1 of 1   ADDENDUM:   Critical results were called by Dr. Brissa Webber to SELENA Iraheta on   8/10/2022 at 1:36 PM EST. Final   Bilateral pulmonary emboli with segmental emboli in the left lower lobe and   subsegmental emboli in the lingula and both lower lobes. Findings suspicious for small pulmonary infarcts in the left greater than   right lower lobes. Small layering left pleural effusion with mild left lower lobe atelectasis.                  LABS:  Labs Reviewed   CBC WITH AUTO DIFFERENTIAL - Abnormal; Notable for the following components:       Result Value    RBC 4.21 (*)     Hematocrit 38.9 (*)     Seg Neutrophils 85 (*)     Lymphocytes 8 (*)     Eosinophils % 0 (*)     Absolute Lymph # 0.70 (*)     All other components within normal limits   COMPREHENSIVE METABOLIC PANEL - Abnormal; Notable for the following components:    Glucose 141 (*)     All other components within normal limits   APTT - Abnormal; Notable for the following components:    PTT 61.6 (*)     All other components within normal limits   APTT - Abnormal; Notable for the following components:    PTT 50.8 (*)     All other components within normal limits   CBC - Abnormal; Notable for the following components:    RBC 3.97 (*)     Hemoglobin 12.8 (*)     Hematocrit 36.7 (*)     All other components within normal limits   CBC WITH AUTO DIFFERENTIAL - Abnormal; Notable for the following components:    RBC 4.12 (*)     Hemoglobin 13.1 (*)     Hematocrit 38.3 (*)     All other components within normal limits   APTT - Abnormal; Notable for the following components:    PTT 33.5 (*)     All other components within normal limits   COVID-19, RAPID   LIPASE   TROPONIN   D-DIMER, QUANTITATIVE   ANNETTE SCREEN WITH REFLEX   HOMOCYSTEINE   LUPUS ANTICOAGULANT   HEMOGLOBIN B7B   BASIC METABOLIC PANEL W/ REFLEX TO MG FOR LOW K   ANTITHROMBIN 3 ACTIVITY   FACTOR 5 LEIDEN   PROTEIN S ACTIVITY   PROTHROMBIN GENE MUTATION   MTHFR MUTATION   PROTEIN C FUNCTIONAL       EMERGENCY DEPARTMENT COURSE:     1212  Significant left-sided pleuritic pain without any tenderness to palpation of the chest wall. Patient is PERC negative but given the level of discomfort that he is exhibiting I am adding a D-dimer to his cardiac work-up. Lungs are clear to auscultation, all vital signs are stable. No significant abd TTP. He denies any PMH. Risk Stratification for Pulmonary Embolism (PE)  Previous PE - No  Malignancy - No  Obesity - No  Trauma - No  Waldo filter - No  Pregnancy/postpartum - No  Smoking - Yes  Prior DVT - No  Immobilization (i.e. leg cast, travel) - No  Surgery within the last 60 days - No  Coagulation disorder - No  Estrogen medicine - No      PERC Criteria     Age      > or = 50    No  Heart Rate     > or = 100   No  Pulse Oximetry    >  95%    Yes  Previous History of DVT   No  Trauma or Surgery within 4 Weeks-   No  Hemoptysis-    No  Exogenous Estrogen use-  No  Unilateral Leg Swelling-   No    1347  Bilat PE, discussed with pt and Dr Goodman/[place] for admit. Additional labs and Heparin ordered. Pt agreeable to admit.        Orders Placed This Encounter   Medications    ketorolac (TORADOL) injection 15 mg    0.9 % sodium chloride bolus    DISCONTD: sodium chloride flush 0.9 % injection 10 mL    DISCONTD: 0.9 % sodium chloride bolus    iopamidol (ISOVUE-370) 76 % injection 75 mL    DISCONTD: heparin (porcine) injection 5,620 Units    DISCONTD: heparin 25,000 units in dextrose 5% 250 mL (premix) infusion    DISCONTD: sodium chloride flush 0.9 % injection 5-40 mL    DISCONTD: sodium chloride flush 0.9 % injection 5-40 mL    DISCONTD: 0.9 % sodium chloride infusion    DISCONTD: ondansetron (ZOFRAN-ODT) disintegrating tablet 4 mg    DISCONTD: ondansetron (ZOFRAN) injection 4 mg    DISCONTD: polyethylene glycol (GLYCOLAX) packet 17 g    DISCONTD: acetaminophen (TYLENOL) tablet 650 mg    DISCONTD: acetaminophen (TYLENOL) suppository 650 mg    DISCONTD: heparin (porcine) injection 5,620 Units    DISCONTD: heparin (porcine) injection 2,810 Units    DISCONTD: heparin 25,000 units in dextrose 5% 250 mL (premix) infusion    DISCONTD: heparin 25,000 units in dextrose 5% 250 mL (premix) infusion    heparin (porcine) injection 4,970 Units    DISCONTD: heparin (porcine) injection 4,970 Units    DISCONTD: heparin (porcine) injection 2,480 Units    DISCONTD: morphine (PF) injection 2 mg    DISCONTD: morphine injection 4 mg    DISCONTD: apixaban (ELIQUIS) tablet 10 mg     Order Specific Question:   Indication of Use     Answer:   Treatment-DVT/PE    DISCONTD: apixaban (ELIQUIS) tablet 5 mg     Order Specific Question:   Indication of Use     Answer:   Treatment-DVT/PE    apixaban (ELIQUIS) 5 MG TABS tablet     Sig: Take 2 tablets by mouth in the morning and 2 tablets before bedtime. Do all this for 14 doses. Dispense:  28 tablet     Refill:  0    apixaban (ELIQUIS) 5 MG TABS tablet     Sig: Take 1 tablet by mouth in the morning and 1 tablet before bedtime. Dispense:  60 tablet     Refill:  5    oxyCODONE-acetaminophen (PERCOCET) 5-325 MG per tablet     Sig: Take 1 tablet by mouth every 6 hours as needed for Pain for up to 3 days. Intended supply: 3 days. Take lowest dose possible to manage pain     Dispense:  12 tablet     Refill:  0     Reduce doses taken as pain becomes manageable    DISCONTD: oxyCODONE-acetaminophen (PERCOCET) 5-325 MG per tablet 1 tablet       CONSULTS:  None      FINAL IMPRESSION      1.  Bilateral pulmonary embolism (HCC)    2. Chest pain on breathing          DISPOSITION/PLAN:  DISPOSITION Admitted 08/10/2022 02:11:06 PM        PATIENT REFERRED TO:  Edy Santoyo MD  [address] [place] [address]   [facility] [place] [address]  [phone]    Schedule an appointment as soon as possible for a visit  Hypercoagulability workup    DISCHARGE MEDICATIONS:  Discharge Medication List as of 8/11/2022 10:20 AM        START taking these medications    Details   !! apixaban (ELIQUIS) 5 MG TABS tablet Take 2 tablets by mouth in the morning and 2 tablets before bedtime. Do all this for 14 doses. , Disp-28 tablet, R-0Normal      !! apixaban (ELIQUIS) 5 MG TABS tablet Take 1 tablet by mouth in the morning and 1 tablet before bedtime. , Disp-60 tablet, R-5Normal      oxyCODONE-acetaminophen (PERCOCET) 5-325 MG per tablet Take 1 tablet by mouth every 6 hours as needed for Pain for up to 3 days. Intended supply: 3 days. Take lowest dose possible to manage pain, Disp-12 tablet, R-0Normal       !! - Potential duplicate medications found. Please discuss with provider.           (Please note that portions of this note were completed with a voice recognition program.  Efforts were made to edit the dictations but occasionally words are mis-transcribed.)    MARISOL Soto PA-C  08/15/22 7895

## 2022-08-10 NOTE — H&P
[facility]  Office: [address], DO, Shira Nyhan, DO, Radha Fall, DO, Meron Kali Blood, DO, Angel Lucas MD, Tahmina May MD, Sheldon Booth MD, Claudio Rodriguez MD,  Andreina Thomas MD, Luana Waters MD, Maisha Coronel, DO, Buck Leija MD,  Ghada Boone MD, Verena Hannon MD, Adriel Maldonado, DO, Simba Brantley MD, Pedro Ace MD, Gerry Spears MD, Praveen Zapata, DO, Jeremiah Singer MD, Lyndon Ferraro MD, Graham Varela, CNP,  Zaira Joaquin, CNP, Michael Faria, CNP, Елена Key, CNP, Kulwant Edwards PA-C, Satish Alvarado, DNP, Shar Tubbs, CNP, Chase Johnson, CNP, Timothy Blackmon, CNP, Marimar Aragon, CNP, Turner Johansen, CNP, Sarah Watts, CNS, Daniel Cortez, DNP, Margarito Ramirez, CNP, Marie Landers, CNP, Yael Mack, CNP           [address] [facility]    HISTORY AND PHYSICAL EXAMINATION            Date:   8/10/2022  Patient name:  Mary Yap  Date of admission:  8/10/2022 11:18 AM  MRN:   [id number]  Account:  [de-identified]  YOB: 1995  PCP:    No primary care provider on file. Room:   ER06/ER06  Code Status:    No Order    Chief Complaint:     Chief Complaint   Patient presents with    Chest Pain     Left sided, x 4days, constant, increasing pain to left shoulder and back when deep breathing     History Obtained From:     patient    History of Present Illness:     Mary Yap is a 33 y.o. male with no past medical history (on no medications) who presented to the emergency department on 8/10/2022 complaining of left-sided chest wall pain. The patient states that the pain began several days ago and has progressively worsened. The pain is pleuritic and is aggravated by taking a deep breath. In the ED, the patient was afebrile and nontoxic appearing. D-dimer was found to be elevated at 3.28.  CTPA was done and was remarkable for bilateral pulmonary emboli as well as consolidative opacities suggestive of pulmonary infarcts. The patient denies having a history of previous blood clots and states that no one in his family has had a blood clot before. He is admitted to internal medicine for further management of bilateral pulmonary emboli. Past Medical History:     History reviewed. No pertinent past medical history. Past Surgical History:     History reviewed. No pertinent surgical history. Medications Prior to Admission:     Prior to Admission medications    Not on File        Allergies:     Patient has no known allergies. Social History:     Tobacco:    reports that he has been smoking cigarettes. He uses smokeless tobacco.  Alcohol:      reports that he does not currently use alcohol. Drug Use:  reports no history of drug use. Family History:     History reviewed. No pertinent family history. Review of Systems:     Positive and Negative as described in HPI. CONSTITUTIONAL:  negative for fevers, chills, sweats, fatigue, weight loss  HEENT:  negative for vision, hearing changes, runny nose, throat pain  RESPIRATORY:  negative for shortness of breath, cough, congestion, wheezing  CARDIOVASCULAR:  Positive for chest pain.    GASTROINTESTINAL:  negative for nausea, vomiting, diarrhea, constipation, change in bowel habits, abdominal pain   GENITOURINARY:  negative for difficulty of urination, burning with urination, frequency   INTEGUMENT:  negative for rash, skin lesions, easy bruising   HEMATOLOGIC/LYMPHATIC:  negative for swelling/edema   ALLERGIC/IMMUNOLOGIC:  negative for urticaria , itching  ENDOCRINE:  negative increase in drinking, increase in urination, hot or cold intolerance  MUSCULOSKELETAL:  negative joint pains, muscle aches, swelling of joints  NEUROLOGICAL:  negative for headaches, dizziness, lightheadedness, numbness, pain, tingling extremities  BEHAVIOR/PSYCH:  negative for depression, anxiety    Physical Exam:   /78   Pulse 58   Temp 98.4 °F (36.9 °C) (Oral)   Resp 15   Ht 5' 8\" (1.727 m)   Wt 136 lb 12.8 oz (62.1 kg)   SpO2 99%   BMI 20.80 kg/m²   Temp (24hrs), Av.4 °F (36.9 °C), Min:98.4 °F (36.9 °C), Max:98.4 °F (36.9 °C)    No results for input(s): POCGLU in the last 72 hours.   No intake or output data in the 24 hours ending 08/10/22 1358    General Appearance:  alert, well appearing, and in no acute distress  Mental status: oriented to person, place, and time  Head:  normocephalic, atraumatic  Eye: no icterus, redness, pupils equal and reactive, extraocular eye movements intact, conjunctiva clear  Ear: normal external ear, no discharge, hearing intact  Nose:  no drainage noted  Mouth: mucous membranes moist  Neck: supple, no carotid bruits, thyroid not palpable  Lungs: Bilateral equal air entry, clear to ausculation, no wheezing, rales or rhonchi, normal effort  Cardiovascular: normal rate, regular rhythm, no murmur, gallop, rub  Abdomen: Soft, nontender, nondistended, normal bowel sounds, no hepatomegaly or splenomegaly  Neurologic: There are no new focal motor or sensory deficits, normal muscle tone and bulk, no abnormal sensation, normal speech, cranial nerves II through XII grossly intact  Skin: No gross lesions, rashes, bruising or bleeding on exposed skin area  Extremities:  peripheral pulses palpable, no pedal edema or calf pain with palpation  Psych: normal affect     Investigations:      Laboratory Testing:  Recent Results (from the past 24 hour(s))   CBC with Auto Differential    Collection Time: 08/10/22 11:30 AM   Result Value Ref Range    WBC 8.8 3.5 - 11.0 k/uL    RBC 4.21 (L) 4.5 - 5.9 m/uL    Hemoglobin 13.6 13.5 - 17.5 g/dL    Hematocrit 38.9 (L) 41 - 53 %    MCV 92.5 80 - 100 fL    MCH 32.3 26 - 34 pg    MCHC 34.9 31 - 37 g/dL    RDW 12.9 12.5 - 15.4 %    Platelets 585 471 - 717 k/uL    MPV 7.7 6.0 - 12.0 fL    Seg Neutrophils 85 (H) 36 - 66 %    Lymphocytes 8 (L) 24 - 44 %    Monocytes 7 2 - 11 %    Eosinophils % Tobacco abuse 8/10/2022 Yes       Plan:     Patient status inpatient in the Progressive Unit/Step down    Bilateral pulmonary emboli   -CTPA showing bilateral pulmonary emboli with segmental emboli in the lingula as well as bilateral lower lobes   -Continuous heparin infusion x 24 hours; plan to transition to Eliquis on discharge   -Bilateral lower extremity dopplers pending   -Echocardiogram pending   -Hypercoagulability workup pending   -COVID-19 rapid test pending   -Daily CBC   -Daily BMP     Tobacco abuse   -Patient states that he smokes about a pack of cigarettes daily   -Will  on cessation     Consultations:   None      Clayton Berumen MD  8/10/2022  1:58 PM    Copy sent to Dr. Caleb Oswald primary care provider on file.

## 2022-08-11 ENCOUNTER — APPOINTMENT (OUTPATIENT)
Dept: ULTRASOUND IMAGING | Age: 27
End: 2022-08-11
Payer: COMMERCIAL

## 2022-08-11 VITALS
TEMPERATURE: 98.4 F | RESPIRATION RATE: 16 BRPM | BODY MASS INDEX: 20.72 KG/M2 | WEIGHT: 136.69 LBS | DIASTOLIC BLOOD PRESSURE: 73 MMHG | HEIGHT: 68 IN | HEART RATE: 61 BPM | OXYGEN SATURATION: 98 % | SYSTOLIC BLOOD PRESSURE: 118 MMHG

## 2022-08-11 LAB
ABSOLUTE EOS #: 0.1 K/UL (ref 0–0.4)
ABSOLUTE LYMPH #: 1.6 K/UL (ref 1–4.8)
ABSOLUTE MONO #: 0.7 K/UL (ref 0.1–1.2)
ANION GAP SERPL CALCULATED.3IONS-SCNC: 9 MMOL/L (ref 9–17)
BASOPHILS # BLD: 1 % (ref 0–2)
BASOPHILS ABSOLUTE: 0 K/UL (ref 0–0.2)
BUN BLDV-MCNC: 11 MG/DL (ref 6–20)
CALCIUM SERPL-MCNC: 9.3 MG/DL (ref 8.6–10.4)
CHLORIDE BLD-SCNC: 103 MMOL/L (ref 98–107)
CO2: 26 MMOL/L (ref 20–31)
CREAT SERPL-MCNC: 0.72 MG/DL (ref 0.7–1.2)
EKG ATRIAL RATE: 60 BPM
EKG P AXIS: 70 DEGREES
EKG P-R INTERVAL: 134 MS
EKG Q-T INTERVAL: 368 MS
EKG QRS DURATION: 90 MS
EKG QTC CALCULATION (BAZETT): 368 MS
EKG R AXIS: 59 DEGREES
EKG T AXIS: 47 DEGREES
EKG VENTRICULAR RATE: 60 BPM
EOSINOPHILS RELATIVE PERCENT: 2 % (ref 1–4)
ESTIMATED AVERAGE GLUCOSE: 103 MG/DL
GFR AFRICAN AMERICAN: >60 ML/MIN
GFR NON-AFRICAN AMERICAN: >60 ML/MIN
GFR SERPL CREATININE-BSD FRML MDRD: NORMAL ML/MIN/{1.73_M2}
GLUCOSE BLD-MCNC: 95 MG/DL (ref 70–99)
HBA1C MFR BLD: 5.2 % (ref 4–6)
HCT VFR BLD CALC: 38.3 % (ref 41–53)
HEMOGLOBIN: 13.1 G/DL (ref 13.5–17.5)
LV EF: 55 %
LVEF MODALITY: NORMAL
LYMPHOCYTES # BLD: 25 % (ref 24–44)
MCH RBC QN AUTO: 31.8 PG (ref 26–34)
MCHC RBC AUTO-ENTMCNC: 34.2 G/DL (ref 31–37)
MCV RBC AUTO: 92.8 FL (ref 80–100)
MONOCYTES # BLD: 10 % (ref 2–11)
PARTIAL THROMBOPLASTIN TIME: 33.5 SEC (ref 21.3–31.3)
PARTIAL THROMBOPLASTIN TIME: 50.8 SEC (ref 21.3–31.3)
PDW BLD-RTO: 12.9 % (ref 12.5–15.4)
PLATELET # BLD: 211 K/UL (ref 140–450)
PMV BLD AUTO: 7.6 FL (ref 6–12)
POTASSIUM SERPL-SCNC: 3.9 MMOL/L (ref 3.7–5.3)
RBC # BLD: 4.12 M/UL (ref 4.5–5.9)
SEG NEUTROPHILS: 62 % (ref 36–66)
SEGMENTED NEUTROPHILS ABSOLUTE COUNT: 4.2 K/UL (ref 1.8–7.7)
SODIUM BLD-SCNC: 138 MMOL/L (ref 135–144)
WBC # BLD: 6.6 K/UL (ref 3.5–11)

## 2022-08-11 PROCEDURE — 36415 COLL VENOUS BLD VENIPUNCTURE: CPT

## 2022-08-11 PROCEDURE — 6360000002 HC RX W HCPCS: Performed by: NURSE PRACTITIONER

## 2022-08-11 PROCEDURE — 85025 COMPLETE CBC W/AUTO DIFF WBC: CPT

## 2022-08-11 PROCEDURE — 93306 TTE W/DOPPLER COMPLETE: CPT

## 2022-08-11 PROCEDURE — 85730 THROMBOPLASTIN TIME PARTIAL: CPT

## 2022-08-11 PROCEDURE — G0378 HOSPITAL OBSERVATION PER HR: HCPCS

## 2022-08-11 PROCEDURE — 2580000003 HC RX 258: Performed by: STUDENT IN AN ORGANIZED HEALTH CARE EDUCATION/TRAINING PROGRAM

## 2022-08-11 PROCEDURE — 80048 BASIC METABOLIC PNL TOTAL CA: CPT

## 2022-08-11 PROCEDURE — 96366 THER/PROPH/DIAG IV INF ADDON: CPT

## 2022-08-11 PROCEDURE — 93971 EXTREMITY STUDY: CPT

## 2022-08-11 PROCEDURE — 96376 TX/PRO/DX INJ SAME DRUG ADON: CPT

## 2022-08-11 PROCEDURE — 6360000002 HC RX W HCPCS: Performed by: STUDENT IN AN ORGANIZED HEALTH CARE EDUCATION/TRAINING PROGRAM

## 2022-08-11 PROCEDURE — 6370000000 HC RX 637 (ALT 250 FOR IP): Performed by: STUDENT IN AN ORGANIZED HEALTH CARE EDUCATION/TRAINING PROGRAM

## 2022-08-11 PROCEDURE — 99217 PR OBSERVATION CARE DISCHARGE MANAGEMENT: CPT | Performed by: STUDENT IN AN ORGANIZED HEALTH CARE EDUCATION/TRAINING PROGRAM

## 2022-08-11 RX ORDER — OXYCODONE HYDROCHLORIDE AND ACETAMINOPHEN 5; 325 MG/1; MG/1
1 TABLET ORAL EVERY 4 HOURS PRN
Status: DISCONTINUED | OUTPATIENT
Start: 2022-08-11 | End: 2022-08-11 | Stop reason: HOSPADM

## 2022-08-11 RX ORDER — OXYCODONE HYDROCHLORIDE AND ACETAMINOPHEN 5; 325 MG/1; MG/1
1 TABLET ORAL EVERY 6 HOURS PRN
Qty: 12 TABLET | Refills: 0 | Status: SHIPPED | OUTPATIENT
Start: 2022-08-11 | End: 2022-08-14

## 2022-08-11 RX ADMIN — SODIUM CHLORIDE, PRESERVATIVE FREE 10 ML: 5 INJECTION INTRAVENOUS at 07:45

## 2022-08-11 RX ADMIN — HEPARIN SODIUM 2480 UNITS: 1000 INJECTION INTRAVENOUS; SUBCUTANEOUS at 02:16

## 2022-08-11 RX ADMIN — MORPHINE SULFATE 4 MG: 4 INJECTION INTRAVENOUS at 02:21

## 2022-08-11 RX ADMIN — APIXABAN 10 MG: 5 TABLET, FILM COATED ORAL at 07:44

## 2022-08-11 RX ADMIN — HEPARIN SODIUM AND DEXTROSE 20 UNITS/KG/HR: 10000; 5 INJECTION INTRAVENOUS at 02:16

## 2022-08-11 RX ADMIN — OXYCODONE HYDROCHLORIDE AND ACETAMINOPHEN 1 TABLET: 5; 325 TABLET ORAL at 11:03

## 2022-08-11 ASSESSMENT — PAIN SCALES - GENERAL
PAINLEVEL_OUTOF10: 8
PAINLEVEL_OUTOF10: 7

## 2022-08-11 NOTE — CARE COORDINATION
08/11/22 0858   Service Assessment   Patient Orientation Alert and Oriented   Cognition Alert   History Provided By Patient   Primary Caregiver Self   Support Systems Spouse/Significant Other   Patient's Healthcare Decision Maker is: Legal Next of Kin   PCP Verified by CM No   Prior Functional Level Independent in ADLs/IADLs   Current Functional Level Independent in ADLs/IADLs   Can patient return to prior living arrangement Yes   Social/Functional History   Lives With Significant other;Daughter   Type of Home Apartment   ADL Assistance Independent   Homemaking Assistance Independent   Ambulation Assistance Independent   Transfer Assistance Independent   Active  Yes   Discharge Planning   Type of Residence Apartment   Living Arrangements Spouse/Significant Other;Children   Current Services Prior To Admission None   Potential Assistance Needed N/A   Type of Home Care Services None   Patient expects to be discharged to: [address] [place] [address] Discharge   Mode of Transport at Discharge Self   Confirm Follow Up Transport Family   Condition of Participation: Discharge Planning   The Patient and/or Patient Representative was provided with a Choice of Provider? Patient   The Patient and/Or Patient Representative agree with the Discharge Plan?  Yes       D/c home independent

## 2022-08-11 NOTE — DISCHARGE SUMMARY
found to be elevated at 3.28. CTPA was done and was remarkable for bilateral pulmonary emboli as well as consolidative opacities suggestive of pulmonary infarcts. The patient denies having a history of previous blood clots and states that no one in his family has had a blood clot before. He was admitted to internal medicine for further management of bilateral pulmonary emboli. The patient improved with a continuous heparin infusion during admission. He was transitioned to Eliquis prior to discharge. The patient is discharged today (8/11) in stable condition. He is instructed to start taking Eliquis as prescribed and follow-up with hematology for an outpatient hypercoagulability workup. Significant therapeutic interventions: Heparin infusion     Significant Diagnostic Studies:   Labs / Micro:  BMP:    Lab Results   Component Value Date/Time    GLUCOSE 95 08/11/2022 01:40 AM     08/11/2022 01:40 AM    K 3.9 08/11/2022 01:40 AM     08/11/2022 01:40 AM    CO2 26 08/11/2022 01:40 AM    ANIONGAP 9 08/11/2022 01:40 AM    BUN 11 08/11/2022 01:40 AM    CREATININE 0.72 08/11/2022 01:40 AM    CALCIUM 9.3 08/11/2022 01:40 AM    LABGLOM >60 08/11/2022 01:40 AM    GFRAA >60 08/11/2022 01:40 AM    GFR      08/11/2022 01:40 AM       Radiology:  CT CHEST PULMONARY EMBOLISM W CONTRAST    Addendum Date: 8/10/2022    ADDENDUM: Critical results were called by Dr. Bae Loss to SELENA Marina on 8/10/2022 at 1:36 PM EST. Result Date: 8/10/2022  Bilateral pulmonary emboli with segmental emboli in the left lower lobe and subsegmental emboli in the lingula and both lower lobes. Findings suspicious for small pulmonary infarcts in the left greater than right lower lobes. Small layering left pleural effusion with mild left lower lobe atelectasis.        Consultations:    Consults:     Final Specialist Recommendations/Findings:   None      The patient was seen and examined on day of discharge and this discharge summary is in conjunction with any daily progress note from day of discharge. Discharge plan:     Disposition: Home    Physician Follow Up:     [place]  [address] [place] [address]  [phone]    Schedule an appointment as soon as possible for a visit  Hypercoagulability workup       Requiring Further Evaluation/Follow Up POST HOSPITALIZATION/Incidental Findings: Patient will need to follow-up with hematology for an outpatient hypercoagulability workup. Diet: regular diet    Activity: As tolerated    Instructions to Patient: Take Eliquis as prescribed for a minimum of 6-months. Follow-up with hematology for an outpatient hypercoagulability workup. Discharge Medications:      Medication List        START taking these medications      * apixaban 5 MG Tabs tablet  Commonly known as: ELIQUIS  Take 2 tablets by mouth in the morning and 2 tablets before bedtime. Do all this for 14 doses. * apixaban 5 MG Tabs tablet  Commonly known as: ELIQUIS  Take 1 tablet by mouth in the morning and 1 tablet before bedtime. Start taking on: August 18, 2022     oxyCODONE-acetaminophen 5-325 MG per tablet  Commonly known as: Percocet  Take 1 tablet by mouth every 6 hours as needed for Pain for up to 3 days. Intended supply: 3 days. Take lowest dose possible to manage pain           * This list has 2 medication(s) that are the same as other medications prescribed for you. Read the directions carefully, and ask your doctor or other care provider to review them with you. Where to Get Your Medications        These medications were sent to [facility] [id number] Patrice Mccann, [address]  [address], [address]      Phone: [phone]   apixaban 5 MG Tabs tablet  apixaban 5 MG Tabs tablet  oxyCODONE-acetaminophen 5-325 MG per tablet         No discharge procedures on file.     Time Spent on discharge is  31 mins in patient examination, evaluation, counseling as well as medication reconciliation, prescriptions for required medications, discharge plan and follow up. Electronically signed by   Angelo Ocasio MD  8/11/2022  8:33 AM      Thank you Dr. Sheffield primary care provider on file. for the opportunity to be involved in this patient's care.

## 2022-08-11 NOTE — PROGRESS NOTES
Discharge instructions and follow up reviewed with the patient. Telemetry and IV removed. Patient verbalizes understanding of instructions and denies any questions at this time. Belongings packed up. Declined wheelchair, walked patient down to the entrance for discharge.

## 2022-08-12 LAB
ANTI DNA DOUBLE STRANDED: 1.3 IU/ML
ANTI-NUCLEAR ANTIBODY (ANA): NEGATIVE
ENA ANTIBODIES SCREEN: 0.3 U/ML

## 2022-08-15 LAB
ANTICARDIOLIPIN IGA ANTIBODY: 3.9 APL (ref 0–14)
ANTICARDIOLIPIN IGG ANTIBODY: 1.7 GPL (ref 0–10)
CARDIOLIPIN AB IGM: 2.1 MPL (ref 0–10)
DILUTE RUSSELL VIPER VENOM TIME: NORMAL
FACTOR V MUTATION: NEGATIVE
INR BLD: 1
MTHFR INTERPRETATION: NORMAL
MTHFR MUTATION A1286C: NEGATIVE
MTHFR MUTATION C665T: NORMAL
PARTIAL THROMBOPLASTIN TIME: 28.6 SEC (ref 21.3–31.3)
PROTHROMBIN G20210A MUTATION: NEGATIVE
PROTHROMBIN TIME: 10.5 SEC (ref 9.4–12.6)
PT PCR SPECIMEN: NORMAL
SPECIMEN: NORMAL
SPECIMEN: NORMAL

## 2022-08-17 LAB
AT-III ACTIVITY: 98 % (ref 83–122)
PROTEIN C ACTIVITY: 88 %
PROTEIN S ACTIVITY: 81 % (ref 77–116)

## 2022-08-18 ENCOUNTER — TELEPHONE (OUTPATIENT)
Dept: ONCOLOGY | Age: 27
End: 2022-08-18

## 2022-08-18 NOTE — TELEPHONE ENCOUNTER
Received call from patient stating that the other number he called told him that Dr Rebekah Botello was not currently accepting new patients.     Patient was very concerned that he wasn't going to be seen prior to his prescription expiring that he was given while in house at [place] [address]    Scheduled patient this Friday since he was discharged on 8/11 and needed to be seen    Electronically signed by Rebecca Alfaro on 8/18/2022 at 2:27 PM

## 2022-08-19 ENCOUNTER — INITIAL CONSULT (OUTPATIENT)
Dept: ONCOLOGY | Age: 27
End: 2022-08-19
Payer: COMMERCIAL

## 2022-08-19 VITALS
TEMPERATURE: 97.9 F | HEART RATE: 67 BPM | HEIGHT: 69 IN | SYSTOLIC BLOOD PRESSURE: 104 MMHG | WEIGHT: 142.9 LBS | BODY MASS INDEX: 21.17 KG/M2 | DIASTOLIC BLOOD PRESSURE: 49 MMHG

## 2022-08-19 DIAGNOSIS — I26.99 BILATERAL PULMONARY EMBOLISM (HCC): Primary | ICD-10-CM

## 2022-08-19 DIAGNOSIS — Z72.0 TOBACCO ABUSE: ICD-10-CM

## 2022-08-19 PROCEDURE — 99244 OFF/OP CNSLTJ NEW/EST MOD 40: CPT | Performed by: INTERNAL MEDICINE

## 2022-08-19 NOTE — PROGRESS NOTES
DIAGNOSIS:   Bilateral PE, 08/2022    CURRENT THERAPY:  Eliquis   Plan for imaging and lab work     BRIEF CASE HISTORY:   Nicki Martinez is a very pleasant 33 y.o. male who is referred to us for management following recent bilateral PE. He was admitted 08/12/2022 and found to have bilateral PE in the lower lobes, doppler negative for DVT he was discharged on Eliquis. He does have some residual pleuritic chest pain. He denies any provoking events including prolonged illness or surgery with being bedridden, extended travel, steroids or testosterone, herbal supplements, or taking any current medication. He denies any family history of clotting disorder. Hypercoagulable work up done in house was negative. He denies any weight loss or lump in the testicles, he had previous US in 2020 that was negative, found to have infection and treated with antibiotics, no recurrent pain since. He does have smoking addiction. PAST MEDICAL HISTORY: has a past medical history of Other pulmonary embolism without acute cor pulmonale ([address] PAST SURGICAL HISTORY: has no past surgical history on file. CURRENT MEDICATIONS:  has a current medication list which includes the following prescription(s): apixaban. ALLERGIES:  has No Known Allergies. FAMILY HISTORY: Negative for any hematological or oncological conditions. SOCIAL HISTORY:  reports that he has been smoking cigarettes. He uses smokeless tobacco. He reports that he does not currently use alcohol. He reports that he does not use drugs. REVIEW OF SYSTEMS:   General: No fever or night sweats. Weight is stable. ENT: No double or blurred vision, no tinnitus or hearing problem, no dysphagia or sore throat   Respiratory: No shortness of breath, no cough or hemoptysis. +chest pain  Cardiovascular: Denies chest pain, PND or orthopnea. No L E swelling or palpitations. Gastrointestinal: No nausea or vomiting, abdominal pain, diarrhea or constipation. Genitourinary: Denies dysuria, hematuria, frequency, urgency or incontinence. Neurological: Denies headaches, decreased LOC, no sensory or motor focal deficits. Musculoskeletal: No arthralgia no back pain or joint swelling. Skin: There are no rashes or bleeding. Psychiatric: No anxiety, no depression. Endocrine: No diabetes or thyroid disease. Hematologic: No bleeding , no adenopathy. PHYSICAL EXAM: Shows a well appearing 33y.o.-year-old male who is not in pain or distress. Vital Signs: Blood pressure (!) 104/49, pulse 67, temperature 97.9 °F (36.6 °C), temperature source Temporal, height 5' 9\" (1.753 m), weight 142 lb 14.4 oz (64.8 kg). HEENT: Normocephalic and atraumatic. Pupils are equal, round, reactive to light and accommodation. Extraocular muscles are intact. Neck: Showed no JVD, no carotid bruit . Lungs: Clear to auscultation bilaterally. Heart: Regular without any murmur. Abdomen: Soft, nontender. No hepatosplenomegaly. Extremities: Lower extremities show no edema, clubbing, or cyanosis. Breasts: Examination not done today.  Neuro exam: intact cranial nerves bilaterally no motor or sensory deficit, gait is normal. Lymphatic: no adenopathy appreciated in the supraclavicular, axillary, cervical or inguinal area    REVIEW OF LABORATORY DATA:   Lab Results   Component Value Date    WBC 6.6 08/11/2022    HGB 13.1 (L) 08/11/2022    HCT 38.3 (L) 08/11/2022    MCV 92.8 08/11/2022     08/11/2022       Chemistry        Component Value Date/Time     08/11/2022 0140    K 3.9 08/11/2022 0140     08/11/2022 0140    CO2 26 08/11/2022 0140    BUN 11 08/11/2022 0140    CREATININE 0.72 08/11/2022 0140        Component Value Date/Time    CALCIUM 9.3 08/11/2022 0140    ALKPHOS 64 08/10/2022 1130    AST 15 08/10/2022 1130    ALT 13 08/10/2022 1130    BILITOT 0.36 08/10/2022 1130         Latest Reference Range & Units 8/10/22 14:18   Anticardiolipin IgA 0.0 - 14.0 APL 3.9   AT-III Activity 83 - 122 % 98   PROTHROMBIN MUTATION  Negative   dRVVT NEGATIVE for Lupus Anticoagulant  NEGATIVE for Lupus Anticoagulant   PROTEIN C ACTIVITY,PCACG >80 % 88   PROTEIN S ACTIVITY 77 - 116 % 81   Prothrombin Time 9.4 - 12.6 sec 10.5   INR  1.0       REVIEW OF RADIOLOGICAL RESULTS:   08/10/2022 CT PE IMPRESSION:  Bilateral pulmonary emboli with segmental emboli in the left lower lobe and   subsegmental emboli in the lingula and both lower lobes. Findings suspicious for small pulmonary infarcts in the left greater than   right lower lobes. Small layering left pleural effusion with mild left lower lobe atelectasis. IMPRESSION:   Bilateral PE, likely unprovoked-8/2022  Smoking addiction     PLAN:   We reviewed presentation and findings, I confirmed his pleuritic pain correlates with findings. We discussed hypercoagulable work up done in house, which was unremarkable. We reviewed patient's risk factors for bilateral pulmonary embolism. His only risk factor includes smoking. Given young age and male sex recommend completion of hypercoagulable work-up as well as screening patient for any malignancies. I am putting in orders for imaging including US of the testicles, CT of the abdomen,  JAK2 and beta 2 glycoprotein antibodies. I counseled him on smoking cessation as it is large risk factor for recurrent clotting. We discussed safe usage of NSAIDs while patient is on Eliquis. Patient will likely need long-term anticoagulation. Return to clinic in 4 weeks to review results of above testing      Scribe Attestation   This note was created by Valery Grayson acting as scribe for the physician signing this note  Electronically Signed  Valery Grayson, 8/19/2022  Scribe, [employer]. Attending Attestation   Note was reviewed and edited.   I am in agreement with the note as entered    Doc MD Cadence      I spent more than  60 minutes examining, evaluating, reviewing data, counseling the patient and coordinating care. Greater than 50% of time was spent face-to-face with the patient this note is created with the assistance of a speech recognition program.  While intending to generate a document that actually reflects the content of the visit, the document can still have some errors including those of syntax and sound a like substitutions which may escape proof reading. It such instances, actual meaning can be extrapolated by contextual diversion.

## 2022-08-22 ENCOUNTER — TELEPHONE (OUTPATIENT)
Dept: ONCOLOGY | Age: 27
End: 2022-08-22

## 2022-08-22 DIAGNOSIS — I26.99 BILATERAL PULMONARY EMBOLISM (HCC): Primary | ICD-10-CM

## 2022-08-22 NOTE — TELEPHONE ENCOUNTER
Labs now   Scans prior to rv  Rv in 4-5 weeks     Writer discovered patient had [employer] insurance at check out    Patient was given all orders and instructed to schedule with [employer]    Dr Carolynn Cho alerted that patient had insurance that [address] [place] [address] is not contracted with, referral was placed for patient to be seen at [facility], referral faxed to [phone]    PT was given AVS and appt schedule    Electronically signed by Annette Oliveros on 8/22/2022 at 8:08 AM

## 2024-08-05 ENCOUNTER — HOSPITAL ENCOUNTER (EMERGENCY)
Age: 29
Discharge: HOME | End: 2024-08-05
Attending: EMERGENCY MEDICINE
Payer: COMMERCIAL

## 2024-08-05 ENCOUNTER — APPOINTMENT (OUTPATIENT)
Dept: CT IMAGING | Age: 29
End: 2024-08-05
Payer: COMMERCIAL

## 2024-08-05 VITALS
WEIGHT: 150 LBS | BODY MASS INDEX: 22.73 KG/M2 | HEIGHT: 68 IN | DIASTOLIC BLOOD PRESSURE: 79 MMHG | SYSTOLIC BLOOD PRESSURE: 126 MMHG | RESPIRATION RATE: 22 BRPM | HEART RATE: 70 BPM | OXYGEN SATURATION: 97 % | TEMPERATURE: 99.1 F

## 2024-08-05 DIAGNOSIS — I26.94 MULTIPLE SUBSEGMENTAL PULMONARY EMBOLI WITHOUT ACUTE COR PULMONALE (HCC): Primary | ICD-10-CM

## 2024-08-05 LAB
ANION GAP SERPL CALCULATED.3IONS-SCNC: 10 MMOL/L (ref 9–17)
BASOPHILS # BLD: 0.02 K/UL (ref 0–0.2)
BASOPHILS NFR BLD: 0 % (ref 0–2)
BUN SERPL-MCNC: 6 MG/DL (ref 6–20)
CALCIUM SERPL-MCNC: 9.7 MG/DL (ref 8.6–10.4)
CHLORIDE SERPL-SCNC: 100 MMOL/L (ref 98–107)
CO2 SERPL-SCNC: 28 MMOL/L (ref 20–31)
CREAT SERPL-MCNC: 1 MG/DL (ref 0.7–1.2)
EKG ATRIAL RATE: 72 BPM
EKG P AXIS: 65 DEGREES
EKG P-R INTERVAL: 142 MS
EKG Q-T INTERVAL: 340 MS
EKG QRS DURATION: 84 MS
EKG QTC CALCULATION (BAZETT): 372 MS
EKG R AXIS: 49 DEGREES
EKG T AXIS: 31 DEGREES
EKG VENTRICULAR RATE: 72 BPM
EOSINOPHIL # BLD: 0.15 K/UL (ref 0–0.4)
EOSINOPHILS RELATIVE PERCENT: 2 % (ref 1–4)
ERYTHROCYTE [DISTWIDTH] IN BLOOD BY AUTOMATED COUNT: 12.4 % (ref 12.5–15.4)
GFR, ESTIMATED: >90 ML/MIN/1.73M2
GLUCOSE SERPL-MCNC: 96 MG/DL (ref 70–99)
HCT VFR BLD AUTO: 41.8 % (ref 41–53)
HGB BLD-MCNC: 14.8 G/DL (ref 13.5–17.5)
LYMPHOCYTES NFR BLD: 1.69 K/UL (ref 1–4.8)
LYMPHOCYTES RELATIVE PERCENT: 20 % (ref 24–44)
MAGNESIUM SERPL-MCNC: 1.6 MG/DL (ref 1.6–2.6)
MCH RBC QN AUTO: 32 PG (ref 26–34)
MCHC RBC AUTO-ENTMCNC: 35.4 G/DL (ref 31–37)
MCV RBC AUTO: 90.5 FL (ref 80–100)
MONOCYTES NFR BLD: 1.13 K/UL (ref 0.1–1.2)
MONOCYTES NFR BLD: 13 % (ref 2–11)
NEUTROPHILS NFR BLD: 65 % (ref 36–66)
NEUTS SEG NFR BLD: 5.54 K/UL (ref 1.8–7.7)
PLATELET # BLD AUTO: 225 K/UL (ref 140–450)
PMV BLD AUTO: 9.9 FL (ref 8–14)
POTASSIUM SERPL-SCNC: 4 MMOL/L (ref 3.7–5.3)
RBC # BLD AUTO: 4.62 M/UL (ref 4.5–5.9)
SODIUM SERPL-SCNC: 138 MMOL/L (ref 135–144)
TROPONIN I SERPL HS-MCNC: <6 NG/L (ref 0–22)
WBC OTHER # BLD: 8.5 K/UL (ref 3.5–11)

## 2024-08-05 PROCEDURE — 6360000002 HC RX W HCPCS: Performed by: NURSE PRACTITIONER

## 2024-08-05 PROCEDURE — 36415 COLL VENOUS BLD VENIPUNCTURE: CPT

## 2024-08-05 PROCEDURE — 6360000004 HC RX CONTRAST MEDICATION: Performed by: EMERGENCY MEDICINE

## 2024-08-05 PROCEDURE — 71260 CT THORAX DX C+: CPT

## 2024-08-05 PROCEDURE — 99285 EMERGENCY DEPT VISIT HI MDM: CPT

## 2024-08-05 PROCEDURE — 83735 ASSAY OF MAGNESIUM: CPT

## 2024-08-05 PROCEDURE — 6370000000 HC RX 637 (ALT 250 FOR IP)

## 2024-08-05 PROCEDURE — 80048 BASIC METABOLIC PNL TOTAL CA: CPT

## 2024-08-05 PROCEDURE — 96374 THER/PROPH/DIAG INJ IV PUSH: CPT

## 2024-08-05 PROCEDURE — 93005 ELECTROCARDIOGRAM TRACING: CPT | Performed by: NURSE PRACTITIONER

## 2024-08-05 PROCEDURE — 2580000003 HC RX 258: Performed by: EMERGENCY MEDICINE

## 2024-08-05 PROCEDURE — 2580000003 HC RX 258: Performed by: NURSE PRACTITIONER

## 2024-08-05 PROCEDURE — 84484 ASSAY OF TROPONIN QUANT: CPT

## 2024-08-05 PROCEDURE — 85025 COMPLETE CBC W/AUTO DIFF WBC: CPT

## 2024-08-05 RX ORDER — 0.9 % SODIUM CHLORIDE 0.9 %
500 INTRAVENOUS SOLUTION INTRAVENOUS ONCE
Status: COMPLETED | OUTPATIENT
Start: 2024-08-05 | End: 2024-08-05

## 2024-08-05 RX ORDER — HYDROCODONE BITARTRATE AND ACETAMINOPHEN 5; 325 MG/1; MG/1
1 TABLET ORAL EVERY 6 HOURS PRN
Qty: 12 TABLET | Refills: 0 | Status: SHIPPED | OUTPATIENT
Start: 2024-08-05 | End: 2024-08-08

## 2024-08-05 RX ORDER — MORPHINE SULFATE 2 MG/ML
2 INJECTION, SOLUTION INTRAMUSCULAR; INTRAVENOUS ONCE
Status: COMPLETED | OUTPATIENT
Start: 2024-08-05 | End: 2024-08-05

## 2024-08-05 RX ORDER — SODIUM CHLORIDE 0.9 % (FLUSH) 0.9 %
10 SYRINGE (ML) INJECTION PRN
Status: DISCONTINUED | OUTPATIENT
Start: 2024-08-05 | End: 2024-08-05 | Stop reason: HOSPADM

## 2024-08-05 RX ORDER — 0.9 % SODIUM CHLORIDE 0.9 %
80 INTRAVENOUS SOLUTION INTRAVENOUS ONCE
Status: DISCONTINUED | OUTPATIENT
Start: 2024-08-05 | End: 2024-08-05 | Stop reason: HOSPADM

## 2024-08-05 RX ORDER — FENTANYL CITRATE 50 UG/ML
25 INJECTION, SOLUTION INTRAMUSCULAR; INTRAVENOUS ONCE
Status: DISCONTINUED | OUTPATIENT
Start: 2024-08-05 | End: 2024-08-05

## 2024-08-05 RX ADMIN — SODIUM CHLORIDE, PRESERVATIVE FREE 10 ML: 5 INJECTION INTRAVENOUS at 10:04

## 2024-08-05 RX ADMIN — IOPAMIDOL 75 ML: 755 INJECTION, SOLUTION INTRAVENOUS at 10:04

## 2024-08-05 RX ADMIN — APIXABAN 10 MG: 5 TABLET, FILM COATED ORAL at 12:04

## 2024-08-05 RX ADMIN — Medication 80 ML: at 10:04

## 2024-08-05 RX ADMIN — MORPHINE SULFATE 2 MG: 2 INJECTION, SOLUTION INTRAMUSCULAR; INTRAVENOUS at 10:29

## 2024-08-05 RX ADMIN — SODIUM CHLORIDE 500 ML: 9 INJECTION, SOLUTION INTRAVENOUS at 09:54

## 2024-08-05 ASSESSMENT — PAIN SCALES - GENERAL
PAINLEVEL_OUTOF10: 8
PAINLEVEL_OUTOF10: 5
PAINLEVEL_OUTOF10: 9

## 2024-08-05 ASSESSMENT — LIFESTYLE VARIABLES
HOW OFTEN DO YOU HAVE A DRINK CONTAINING ALCOHOL: 2-3 TIMES A WEEK
HOW MANY STANDARD DRINKS CONTAINING ALCOHOL DO YOU HAVE ON A TYPICAL DAY: 1 OR 2

## 2024-08-05 ASSESSMENT — PAIN DESCRIPTION - LOCATION: LOCATION: CHEST;BACK

## 2024-08-05 ASSESSMENT — PAIN - FUNCTIONAL ASSESSMENT: PAIN_FUNCTIONAL_ASSESSMENT: 0-10

## 2024-08-05 NOTE — DISCHARGE INSTRUCTIONS
Home.  Eliquis as prescribed.    10 mg twice daily for 7 days    5 mg twice daily thereafter.    Follow-up with primary care physician, [facility] medicine    Follow-up with hematology/oncology Dr. Solorzano    Return for chest pain, shortness of breath, difficulty breathing, palpitations, worsening symptoms in any way, or any other concerns.

## 2024-08-05 NOTE — ED TRIAGE NOTES
Pt reports sob that started yesterday along with chest pain.  Pt was dx with bilateral Pe's last year & took himself off of eliquis a few months because he was bleeding too much.

## 2024-08-05 NOTE — ED PROVIDER NOTES
Small-moderate volume segmental and subsegmental right lower lobe pulmonary  embolus. Minimal segmental left upper lobe pulmonary embolus.    Right lower lobe pulmonary infarct.    Additional findings noted above.    Critical results were called by Dr. Wade Wright to Sneha Lopez on  8/5/2024 at 11:36.            Narrative:    EXAMINATION:  CTA OF THE CHEST 8/5/2024 9:57 am    TECHNIQUE:  CTA of the chest was performed after the administration of intravenous  contrast.  Multiplanar reformatted images are provided for review.  MIP  images are provided for review. Automated exposure control, iterative  reconstruction, and/or weight based adjustment of the mA/kV was utilized to  reduce the radiation dose to as low as reasonably achievable.    COMPARISON:  None.    HISTORY:  ORDERING SYSTEM PROVIDED HISTORY: chest pain, shortness of breath  TECHNOLOGIST PROVIDED HISTORY:  chest pain, shortness of breath  Additional Contrast?->1  Reason for Exam: chest pain, SOB    FINDINGS:  Pulmonary Arteries: Pulmonary arteries are adequately opacified for  evaluation.  Small-moderate volume segmental and subsegmental right lower  lobe pulmonary embolus.  Minimal segmental medial left upper lobe pulmonary  embolus.  Main pulmonary artery is normal in caliber.    Mediastinum: No axillary or mediastinal adenopathy.  Increased soft tissue  density anterior mediastinum likely represents residual thymic tissue.  Thyroid gland is unremarkable in appearance.    Great vessels are normal in position and size.  Cardiac chambers unremarkable  in appearance.  No pericardial effusion or pericardial thickening.    Lungs/pleura: Infarct involving the posterior aspect of the right lower lobe.  Atelectasis or scarring in the left lower lobe.  No pneumothorax.  Central  airways are patent.    Upper Abdomen: Limited images of the upper abdomen are unremarkable.    Soft Tissues/Bones: No acute osseous abnormality identified.                PERTINENT 
but occasionally words are mis-transcribed.)    LUCA Mckeon CNP (electronically signed)             Sneha Lopez APRN - CNP  08/05/24 [id number]

## 2024-08-14 ENCOUNTER — OFFICE VISIT (OUTPATIENT)
Age: 29
End: 2024-08-14
Payer: COMMERCIAL

## 2024-08-14 VITALS
TEMPERATURE: 98 F | SYSTOLIC BLOOD PRESSURE: 116 MMHG | DIASTOLIC BLOOD PRESSURE: 62 MMHG | HEIGHT: 68 IN | WEIGHT: 147 LBS | RESPIRATION RATE: 18 BRPM | BODY MASS INDEX: 22.28 KG/M2 | HEART RATE: 76 BPM

## 2024-08-14 DIAGNOSIS — Z71.6 ENCOUNTER FOR SMOKING CESSATION COUNSELING: ICD-10-CM

## 2024-08-14 DIAGNOSIS — F17.210 CIGARETTE SMOKER: Primary | ICD-10-CM

## 2024-08-14 DIAGNOSIS — I26.94 MULTIPLE SUBSEGMENTAL PULMONARY EMBOLI WITHOUT ACUTE COR PULMONALE (HCC): ICD-10-CM

## 2024-08-14 DIAGNOSIS — M25.511 ACUTE PAIN OF RIGHT SHOULDER: ICD-10-CM

## 2024-08-14 PROCEDURE — 99202 OFFICE O/P NEW SF 15 MIN: CPT

## 2024-08-14 PROCEDURE — 99214 OFFICE O/P EST MOD 30 MIN: CPT

## 2024-08-14 RX ORDER — NICOTINE 21 MG/24HR
1 PATCH, TRANSDERMAL 24 HOURS TRANSDERMAL DAILY
Qty: 42 PATCH | Refills: 0 | Status: SHIPPED | OUTPATIENT
Start: 2024-08-14 | End: 2024-08-16 | Stop reason: SDUPTHER

## 2024-08-14 RX ORDER — IBUPROFEN 200 MG
200 TABLET ORAL EVERY 6 HOURS PRN
COMMUNITY

## 2024-08-14 SDOH — ECONOMIC STABILITY: FOOD INSECURITY: WITHIN THE PAST 12 MONTHS, YOU WORRIED THAT YOUR FOOD WOULD RUN OUT BEFORE YOU GOT MONEY TO BUY MORE.: NEVER TRUE

## 2024-08-14 SDOH — ECONOMIC STABILITY: INCOME INSECURITY: HOW HARD IS IT FOR YOU TO PAY FOR THE VERY BASICS LIKE FOOD, HOUSING, MEDICAL CARE, AND HEATING?: NOT HARD AT ALL

## 2024-08-14 SDOH — ECONOMIC STABILITY: FOOD INSECURITY: WITHIN THE PAST 12 MONTHS, THE FOOD YOU BOUGHT JUST DIDN'T LAST AND YOU DIDN'T HAVE MONEY TO GET MORE.: NEVER TRUE

## 2024-08-14 ASSESSMENT — PATIENT HEALTH QUESTIONNAIRE - PHQ9
2. FEELING DOWN, DEPRESSED OR HOPELESS: NOT AT ALL
SUM OF ALL RESPONSES TO PHQ QUESTIONS 1-9: 0
SUM OF ALL RESPONSES TO PHQ9 QUESTIONS 1 & 2: 0
SUM OF ALL RESPONSES TO PHQ QUESTIONS 1-9: 0
SUM OF ALL RESPONSES TO PHQ QUESTIONS 1-9: 0
1. LITTLE INTEREST OR PLEASURE IN DOING THINGS: NOT AT ALL
SUM OF ALL RESPONSES TO PHQ QUESTIONS 1-9: 0

## 2024-08-14 NOTE — PROGRESS NOTES
[facility]  [address] [place] [address]  Phone: [phone]  Fax: [phone]      Date of Visit:  2024  Patient Name: Mj Jo   Patient :  1995         HPI:     Mj Jo is a 29 y.o. male with history of:    Past Medical History:   Diagnosis Date    Other pulmonary embolism without acute cor pulmonale (HCC)         History reviewed. No pertinent surgical history.    Patient Active Problem List   Diagnosis    Bilateral pulmonary embolism (HCC)    Tobacco abuse        Presenting to office today to discuss: New patient establishment of care, 2024 [facility] ED visit follow-up      Patients Comments during rooming:  Chief Complaint   Patient presents with    New Patient     Establish- f/u PE/ ER  Right shoulder, lower back sore, breathing ok    Sees Oncology Aug 16          [place] for Today's Visit:      Interval History      Mj is a 29-year-old male medical history of chronic bilateral pulmonary embolisms on Eliquis who presented to the [facility] ED 2024 with chief complaint of chest pain and shortness of breath.  Per ED documentation the patient had a history of unprovoked bilateral pulmonary embolisms 2 years ago and was on Eliquis indefinitely and took himself off of Eliquis 6 months ago.  Patient was stable without right heart strain so was discharged from    Negative APS    CTA chest    IMPRESSION:  Small-moderate volume segmental and subsegmental right lower lobe pulmonary  embolus. Minimal segmental left upper lobe pulmonary embolus.     Right lower lobe pulmonary infarct.        Reviewing chart from 8/10/2022 [facility] ED visit showing a bilateral pulmonary embolism    CT CHEST PULMONARY EMBOLISM W CONTRAST   8/10/2022     Bilateral pulmonary emboli with segmental emboli in the left lower lobe and   subsegmental emboli in the lingula and both lower lobes.       Findings

## 2024-08-14 NOTE — PATIENT INSTRUCTIONS
It was nice to meet you and thank you for coming in today!=    Here is our plan:    Pulmonary embolism    1) please  Eliquis and take 10 mg twice daily for 7 days  You then take 5 mg twice daily after that  -Please call the office immediately if you run out of Eliquis  -Please follow-up with hematology on 8/16/2024 and they may alter this timeframe of the Eliquis  Okjonathan is pregnant body    2) please call to schedule with pulmonology specialist for further workup    Smoking cessation  1) please  the patches and gum   -You will use 1 patch a day.  Put the patch on the morning and take it off before you go to bed at night  -Have sent in the gum, you will chew and park the gum for 15- 20 minutes until the peppery taste of the gum is gone.  You can use this as needed for cravings relief    Please call the office if you have any questions whatsoever      Thank you for choosing our team at [facility] to be partners with you in your health.    Have a great day!    -Eda    Scheduling and Health Questions:    Schedule your visits on [employer].     You may message you provider on [employer] or Call the office at [phone] to have your message forwarded to the doctors    Laboratory services   -available Mon-Friday 7:30AM-4PM Daily with a Lunch break 12:30-1PM Daily.

## 2024-08-16 ENCOUNTER — TELEPHONE (OUTPATIENT)
Dept: ONCOLOGY | Age: 29
End: 2024-08-16

## 2024-08-16 ENCOUNTER — OFFICE VISIT (OUTPATIENT)
Dept: ONCOLOGY | Age: 29
End: 2024-08-16
Payer: COMMERCIAL

## 2024-08-16 VITALS
HEART RATE: 60 BPM | TEMPERATURE: 97.6 F | BODY MASS INDEX: 22.76 KG/M2 | SYSTOLIC BLOOD PRESSURE: 127 MMHG | OXYGEN SATURATION: 99 % | DIASTOLIC BLOOD PRESSURE: 85 MMHG | WEIGHT: 149.7 LBS | RESPIRATION RATE: 18 BRPM

## 2024-08-16 DIAGNOSIS — I26.99 BILATERAL PULMONARY EMBOLISM (HCC): Primary | ICD-10-CM

## 2024-08-16 DIAGNOSIS — Z71.6 ENCOUNTER FOR SMOKING CESSATION COUNSELING: ICD-10-CM

## 2024-08-16 DIAGNOSIS — Z79.01 ANTICOAGULATED: ICD-10-CM

## 2024-08-16 PROCEDURE — 99211 OFF/OP EST MAY X REQ PHY/QHP: CPT | Performed by: INTERNAL MEDICINE

## 2024-08-16 PROCEDURE — 99214 OFFICE O/P EST MOD 30 MIN: CPT | Performed by: INTERNAL MEDICINE

## 2024-08-16 RX ORDER — NICOTINE 21 MG/24HR
1 PATCH, TRANSDERMAL 24 HOURS TRANSDERMAL DAILY
Qty: 42 PATCH | Refills: 0 | Status: SHIPPED | OUTPATIENT
Start: 2024-08-16 | End: 2024-09-27

## 2024-08-16 NOTE — TELEPHONE ENCOUNTER
Instructions   from Dr. Barrington Solorzano MD    Rv in 4 months        Patient sent to register for labs.  Patient scheduled for 4 month f/u 12/13/2024 at 10:15 am.

## 2024-08-16 NOTE — PROGRESS NOTES
DIAGNOSIS:   Bilateral PE, 08/2022  Recurrent Bilateral pulmonary embolism-8/2024  Tobacco dependence    CURRENT THERAPY:  Eliquis-8/2020 2 through 2023 (patient was lost to follow-up) Eliquis restarted 8/2024, plan for long-term anticoagulation    INTERVAL HISTORY:  Patient presents to the clinic for a follow-up visit and to discuss plan for anticoagulation.  Patient was last seen in office back in August 2022.  Patient was lost to follow-up.  Taking anticoagulation once he ran out of the Eliquis.  Patient was hospitalized with unprovoked bilateral pulmonary embolism.  Started back on anticoagulation.  Patient was smoking before admission has now decided to quit.    During this visit patient's allergy, social, medical, surgical history and medications were reviewed and updated.    BRIEF CASE HISTORY:   Mj Jo is a very pleasant 29 y.o. male who is referred to us for management following recent bilateral PE. He was admitted 08/12/2022 and found to have bilateral PE in the lower lobes, doppler negative for DVT he was discharged on Eliquis. He does have some residual pleuritic chest pain. He denies any provoking events including prolonged illness or surgery with being bedridden, extended travel, steroids or testosterone, herbal supplements, or taking any current medication. He denies any family history of clotting disorder. Hypercoagulable work up done in house was negative. He denies any weight loss or lump in the testicles, he had previous US in 2020 that was negative, found to have infection and treated with antibiotics, no recurrent pain since.   He does have smoking addiction.     PAST MEDICAL HISTORY: has a past medical history of Other pulmonary embolism without acute cor pulmonale (HCC).    PAST SURGICAL HISTORY: has no past surgical history on file.     CURRENT MEDICATIONS:  has a current medication list which includes the following prescription(s): apixaban, nicotine, nicotine polacrilex, and

## 2024-08-28 ENCOUNTER — OFFICE VISIT (OUTPATIENT)
Age: 29
End: 2024-08-28
Payer: COMMERCIAL

## 2024-08-28 VITALS
RESPIRATION RATE: 16 BRPM | HEART RATE: 77 BPM | DIASTOLIC BLOOD PRESSURE: 61 MMHG | TEMPERATURE: 98 F | BODY MASS INDEX: 22.58 KG/M2 | WEIGHT: 149 LBS | SYSTOLIC BLOOD PRESSURE: 109 MMHG | HEIGHT: 68 IN

## 2024-08-28 DIAGNOSIS — Z86.711 HISTORY OF PULMONARY EMBOLUS (PE): ICD-10-CM

## 2024-08-28 DIAGNOSIS — R07.9 CHEST PAIN, UNSPECIFIED TYPE: ICD-10-CM

## 2024-08-28 DIAGNOSIS — Z71.6 ENCOUNTER FOR SMOKING CESSATION COUNSELING: Primary | ICD-10-CM

## 2024-08-28 DIAGNOSIS — Z72.0 SMOKELESS TOBACCO USE: ICD-10-CM

## 2024-08-28 PROBLEM — F17.210 CIGARETTE SMOKER: Status: ACTIVE | Noted: 2024-08-28

## 2024-08-28 PROCEDURE — 99212 OFFICE O/P EST SF 10 MIN: CPT

## 2024-08-28 PROCEDURE — 99214 OFFICE O/P EST MOD 30 MIN: CPT

## 2024-08-28 NOTE — PATIENT INSTRUCTIONS
It was nice to see you and thank you for coming in today!    Today we discussed Smoking Cessation and Chest pain     Here is our plan:      Smoking  -As we discussed I would like you to quit the Magdy pouches if possible.  Please  the patches and gum.  You will put the patch on directly in the morning and take off at night.  Please read the package inserts you are to chew the gum and packet in your cheek for about 20 minutes until the peppery taste is gone    Chest pain/caffeine intake  -As we discussed please decrease your caffeine intake.  Perhaps decrease to 1 energy drink per day and decrease by half the amount of caffeine gum  -You may also use over-the-counter Pepcid when this pain occurs and see if it relieves it.  This would indicate that it is gastric reflux and likely  caused by her  -If it worsens please call the office immediately  -As we discussed please pack your lunch for the road perhaps 2-3 sandwiches with some vegetables and dip.  I also would recommend you drink 3 protein shakes throughout the day.  Premier protein is a good source        Bilateral pulmonary embolism history  -Please call to schedule with pulmonologist [phone]   -Please  your Eliquis refill  -Keep doing a great job but with getting exercise and keeping moving        Have a good rest of your day! Don't hesitate to message on [employer] or Call the office if you have any questions.     Laboratory services   -available Mon-Friday 7:30AM-4PM Daily with a Lunch break 12:30-1PM Daily.

## 2024-08-28 NOTE — PROGRESS NOTES
[facility]  [address] [place] [address]  Phone: [phone]  Fax: [phone]      Date of Visit: 2024  Patient Name: Mj Jo   Patient :  1995         HPI:     Mj Jo is a 29 y.o. male with history of:    Past Medical History:   Diagnosis Date    Other pulmonary embolism without acute cor pulmonale (HCC)         History reviewed. No pertinent surgical history.    Patient Active Problem List   Diagnosis    Bilateral pulmonary embolism (HCC)    Smokeless tobacco use    Cigarette smoker    Encounter for smoking cessation counseling    History of pulmonary embolus (PE)        Presenting to office today to discuss: Smoking cessation, history of pulmonary embolism      Patients Comments during rooming:  Chief Complaint   Patient presents with    Follow-up     Quit smoking!!            Interval History        Mj is a 29-year-old male with significant medical history of 2 unprovoked bilateral pulmonary embolisms.    Patient was last seen by me on 2024.  Was in the middle of taking his treatment dosage of Eliquis 10 mg twice daily and was having right shoulder pain.  Patient was seen by hematology 2024, no further workup was done and the patient was decreased to maintenance dose of Eliquis 5 mg twice daily.  Patient did stop smoking 3 days before his visit with me but was using Magdy smokeless nicotine pouches.  Full nicotine cessation was discussed using patches and gum.      HPI Problem Based         1) smoking cessation  -Has not smoked since 2024  -Has been still using Magdy nicotine pouches using 2 of those per day  -Has not filled his patches or Nicorette gum.  He will go over and do that today or soon  -Has not picked a date yet and will consider this           2) chest pain  -Admits to having chest pain intermmitently.  No shortness of breath with the chest pain.    -Currently is not having  on Eliquis indefinitely  -Would like further workup with pulmonology.  Appreciate 's input     Chest pain, unspecified type  -Chest pain is likely secondary to caffeine and nicotine use.  See patient instructions below    Will follow-up in 4 weeks for yearly physical and to check in on smokeless tobacco cessation      Patient Instructions   It was nice to see you and thank you for coming in today!    Today we discussed Smoking Cessation and Chest pain     Here is our plan:      Smoking  -As we discussed I would like you to quit the Magdy pouches if possible.  Please  the patches and gum.  You will put the patch on directly in the morning and take off at night.  Please read the package inserts you are to chew the gum and packet in your cheek for about 20 minutes until the peppery taste is gone    Chest pain/caffeine intake  -As we discussed please decrease your caffeine intake.  Perhaps decrease to 1 energy drink per day and decrease by half the amount of caffeine gum  -You may also use over-the-counter Pepcid when this pain occurs and see if it relieves it.  This would indicate that it is gastric reflux and likely  caused by her  -If it worsens please call the office immediately  -As we discussed please pack your lunch for the road perhaps 2-3 sandwiches with some vegetables and dip.  I also would recommend you drink 3 protein shakes throughout the day.  Premier protein is a good source        Bilateral pulmonary embolism history  -Please call to schedule with pulmonologist [phone]   -Please  your Eliquis refill  -Keep doing a great job but with getting exercise and keeping moving        Have a good rest of your day! Don't hesitate to message on [employer] or Call the office if you have any questions.     Laboratory services   -available Mon-Friday 7:30AM-4PM Daily with a Lunch break 12:30-1PM Daily.            Return in about 4 weeks (around 9/25/2024) for Yearly Physical.        - All

## 2024-12-13 DIAGNOSIS — Z86.711 HISTORY OF PULMONARY EMBOLUS (PE): ICD-10-CM

## 2024-12-16 RX ORDER — APIXABAN 5 MG/1
5 TABLET, FILM COATED ORAL 2 TIMES DAILY
Qty: 60 TABLET | Refills: 1 | Status: SHIPPED | OUTPATIENT
Start: 2024-12-16

## 2025-05-14 DIAGNOSIS — Z86.711 HISTORY OF PULMONARY EMBOLUS (PE): ICD-10-CM

## 2025-05-15 DIAGNOSIS — Z86.711 HISTORY OF PULMONARY EMBOLUS (PE): ICD-10-CM

## 2025-05-15 RX ORDER — APIXABAN 5 MG/1
5 TABLET, FILM COATED ORAL 2 TIMES DAILY
Qty: 60 TABLET | Refills: 1 | OUTPATIENT
Start: 2025-05-15

## 2025-05-15 NOTE — TELEPHONE ENCOUNTER
Mj Jo is calling to request a refill on the following medication(s):    Last Visit Date (If Applicable):  8/28/2024    Next Visit Date:    Visit date not found    Medication Request:  Requested Prescriptions     Pending Prescriptions Disp Refills    ELIQUIS 5 MG TABS tablet [Pharmacy Med Name: ELIQUIS 5 MG TABLET] 60 tablet 1     Sig: TAKE 1 TABLET BY MOUTH 2 TIMES A DAY